# Patient Record
Sex: FEMALE | Race: WHITE
[De-identification: names, ages, dates, MRNs, and addresses within clinical notes are randomized per-mention and may not be internally consistent; named-entity substitution may affect disease eponyms.]

---

## 2017-05-17 ENCOUNTER — HOSPITAL ENCOUNTER (OUTPATIENT)
Dept: HOSPITAL 76 - LAB.F | Age: 37
Discharge: HOME | End: 2017-05-17
Attending: NURSE PRACTITIONER
Payer: MEDICAID

## 2017-05-17 DIAGNOSIS — R60.1: Primary | ICD-10-CM

## 2017-05-17 LAB
ALBUMIN/GLOB SERPL: 1.3 {RATIO} (ref 1–2.2)
ANION GAP SERPL CALCULATED.4IONS-SCNC: 7 MMOL/L (ref 6–13)
BASOPHILS NFR BLD AUTO: 0 10^3/UL (ref 0–0.1)
BASOPHILS NFR BLD AUTO: 0.3 %
BILIRUB BLD-MCNC: 0.6 MG/DL (ref 0.2–1)
BUN SERPL-MCNC: 8 MG/DL (ref 6–20)
CALCIUM UR-MCNC: 9.2 MG/DL (ref 8.5–10.3)
CHLORIDE SERPL-SCNC: 106 MMOL/L (ref 101–111)
CO2 SERPL-SCNC: 26 MMOL/L (ref 21–32)
CREAT SERPLBLD-SCNC: 0.7 MG/DL (ref 0.4–1)
EOSINOPHIL # BLD AUTO: 0.1 10^3/UL (ref 0–0.7)
EOSINOPHIL NFR BLD AUTO: 1.2 %
ERYTHROCYTE [DISTWIDTH] IN BLOOD BY AUTOMATED COUNT: 13.3 % (ref 12–15)
GFRSERPLBLD MDRD-ARVRAT: 94 ML/MIN/{1.73_M2} (ref 89–?)
GLOBULIN SER-MCNC: 3.2 G/DL (ref 2.1–4.2)
GLUCOSE SERPL-MCNC: 90 MG/DL (ref 70–100)
HCT VFR BLD AUTO: 44.3 % (ref 37–47)
HGB UR QL STRIP: 14.5 G/DL (ref 12–16)
LYMPHOCYTES # SPEC AUTO: 2.7 10^3/UL (ref 1.5–3.5)
LYMPHOCYTES NFR BLD AUTO: 29.3 %
MCH RBC QN AUTO: 29.6 PG (ref 27–31)
MCHC RBC AUTO-ENTMCNC: 32.7 G/DL (ref 32–36)
MCV RBC AUTO: 90.4 FL (ref 81–99)
MONOCYTES # BLD AUTO: 0.5 10^3/UL (ref 0–1)
MONOCYTES NFR BLD AUTO: 5.3 %
NEUTROPHILS # BLD AUTO: 5.8 10^3/UL (ref 1.5–6.6)
NEUTROPHILS # SNV AUTO: 9.1 X10^3/UL (ref 4.8–10.8)
NEUTROPHILS NFR BLD AUTO: 63.9 %
NRBC # BLD AUTO: 0.1 /100WBC
PDW BLD AUTO: 12.2 FL (ref 7.9–10.8)
POTASSIUM SERPL-SCNC: 4.1 MMOL/L (ref 3.5–5)
PROT SPEC-MCNC: 7.4 G/DL (ref 6.7–8.2)
RBC MAR: 4.9 10^6/UL (ref 4.2–5.4)
SODIUM SERPLBLD-SCNC: 139 MMOL/L (ref 135–145)
TSH SERPL-ACNC: 2.19 UIU/ML (ref 0.34–5.6)
WBC # BLD: 9.1 X10^3/UL

## 2017-05-17 PROCEDURE — 80053 COMPREHEN METABOLIC PANEL: CPT

## 2017-05-17 PROCEDURE — 84481 FREE ASSAY (FT-3): CPT

## 2017-05-17 PROCEDURE — 85025 COMPLETE CBC W/AUTO DIFF WBC: CPT

## 2017-05-17 PROCEDURE — 83880 ASSAY OF NATRIURETIC PEPTIDE: CPT

## 2017-05-17 PROCEDURE — 84439 ASSAY OF FREE THYROXINE: CPT

## 2017-05-17 PROCEDURE — 84443 ASSAY THYROID STIM HORMONE: CPT

## 2017-05-17 PROCEDURE — 36415 COLL VENOUS BLD VENIPUNCTURE: CPT

## 2018-05-17 ENCOUNTER — HOSPITAL ENCOUNTER (OUTPATIENT)
Dept: HOSPITAL 76 - LAB.F | Age: 38
Discharge: HOME | End: 2018-05-17
Attending: NURSE PRACTITIONER
Payer: MEDICAID

## 2018-05-17 DIAGNOSIS — I10: Primary | ICD-10-CM

## 2018-05-17 LAB
ALBUMIN DIAFP-MCNC: 4.2 G/DL (ref 3.2–5.5)
ALBUMIN/GLOB SERPL: 1.3 {RATIO} (ref 1–2.2)
ALP SERPL-CCNC: 75 IU/L (ref 42–121)
ALT SERPL W P-5'-P-CCNC: 31 IU/L (ref 10–60)
ANION GAP SERPL CALCULATED.4IONS-SCNC: 9 MMOL/L (ref 6–13)
AST SERPL W P-5'-P-CCNC: 26 IU/L (ref 10–42)
BASOPHILS NFR BLD AUTO: 0 10^3/UL (ref 0–0.1)
BASOPHILS NFR BLD AUTO: 0.3 %
BILIRUB BLD-MCNC: 0.9 MG/DL (ref 0.2–1)
BUN SERPL-MCNC: 10 MG/DL (ref 6–20)
CALCIUM UR-MCNC: 9.3 MG/DL (ref 8.5–10.3)
CHLORIDE SERPL-SCNC: 104 MMOL/L (ref 101–111)
CO2 SERPL-SCNC: 26 MMOL/L (ref 21–32)
CREAT SERPLBLD-SCNC: 0.6 MG/DL (ref 0.4–1)
EOSINOPHIL # BLD AUTO: 0.1 10^3/UL (ref 0–0.7)
EOSINOPHIL NFR BLD AUTO: 1.2 %
ERYTHROCYTE [DISTWIDTH] IN BLOOD BY AUTOMATED COUNT: 13.2 % (ref 12–15)
GFRSERPLBLD MDRD-ARVRAT: 112 ML/MIN/{1.73_M2} (ref 89–?)
GLOBULIN SER-MCNC: 3.2 G/DL (ref 2.1–4.2)
GLUCOSE SERPL-MCNC: 96 MG/DL (ref 70–100)
HGB UR QL STRIP: 13.9 G/DL (ref 12–16)
LYMPHOCYTES # SPEC AUTO: 2.2 10^3/UL (ref 1.5–3.5)
LYMPHOCYTES NFR BLD AUTO: 23.1 %
MCH RBC QN AUTO: 29.6 PG (ref 27–31)
MCHC RBC AUTO-ENTMCNC: 33.3 G/DL (ref 32–36)
MCV RBC AUTO: 89 FL (ref 81–99)
MONOCYTES # BLD AUTO: 0.4 10^3/UL (ref 0–1)
MONOCYTES NFR BLD AUTO: 4.6 %
NEUTROPHILS # BLD AUTO: 6.6 10^3/UL (ref 1.5–6.6)
NEUTROPHILS # SNV AUTO: 9.4 X10^3/UL (ref 4.8–10.8)
NEUTROPHILS NFR BLD AUTO: 70.8 %
PDW BLD AUTO: 11.6 FL (ref 7.9–10.8)
PLATELET # BLD: 210 10^3/UL (ref 130–450)
PROT SPEC-MCNC: 7.4 G/DL (ref 6.7–8.2)
RBC MAR: 4.69 10^6/UL (ref 4.2–5.4)
SODIUM SERPLBLD-SCNC: 139 MMOL/L (ref 135–145)
T4 FREE SERPL-MCNC: 0.87 NG/DL (ref 0.58–1.64)
TSH SERPL-ACNC: 2.17 UIU/ML (ref 0.34–5.6)

## 2018-05-17 PROCEDURE — 84443 ASSAY THYROID STIM HORMONE: CPT

## 2018-05-17 PROCEDURE — 84439 ASSAY OF FREE THYROXINE: CPT

## 2018-05-17 PROCEDURE — 80053 COMPREHEN METABOLIC PANEL: CPT

## 2018-05-17 PROCEDURE — 85025 COMPLETE CBC W/AUTO DIFF WBC: CPT

## 2018-05-17 PROCEDURE — 84481 FREE ASSAY (FT-3): CPT

## 2018-05-17 PROCEDURE — 36415 COLL VENOUS BLD VENIPUNCTURE: CPT

## 2018-07-26 ENCOUNTER — HOSPITAL ENCOUNTER (OUTPATIENT)
Dept: HOSPITAL 76 - RT.S | Age: 38
Discharge: HOME | End: 2018-07-26
Attending: NURSE PRACTITIONER
Payer: MEDICAID

## 2018-07-26 DIAGNOSIS — R00.2: Primary | ICD-10-CM

## 2018-07-26 PROCEDURE — 93005 ELECTROCARDIOGRAM TRACING: CPT

## 2018-09-13 ENCOUNTER — HOSPITAL ENCOUNTER (OUTPATIENT)
Dept: HOSPITAL 76 - DI | Age: 38
Discharge: HOME | End: 2018-09-13
Attending: NURSE PRACTITIONER
Payer: MEDICAID

## 2018-09-13 DIAGNOSIS — R00.2: Primary | ICD-10-CM

## 2018-09-13 DIAGNOSIS — R06.00: ICD-10-CM

## 2018-09-13 DIAGNOSIS — I10: ICD-10-CM

## 2018-09-13 PROCEDURE — 93017 CV STRESS TEST TRACING ONLY: CPT

## 2018-09-15 NOTE — CARDIAC PROCEDURE NOTE
DATE OF SERVICE: 09/13/2018

Physician: TRENT Queen

 

ORDERING PROVIDER:  TRENT Arias

 

PROCEDURE:  Stress echocardiogram.

 

PROCEDURE SYMPTOMS:  Daily palpitations and dyspnea on exertion.

 

CARDIAC RISK FACTORS:  Severe hypertension.

 

PREVIOUS CARDIAC PROCEDURES:  None.

 

CLINICAL HISTORY:  A 38-year-old female without known coronary artery disease.  
She has no current symptoms.

 

INITIAL RESTING VITAL SIGNS:  Blood pressure 124/74, heart rate 76, height 64 
inches, weight 191 pounds, BMI 32.78.  The patient consumed a small cup of 
coffee 5 hours ago.

 

PROCEDURE AND FINDINGS:  The patient's identity and birth date verified.  
Consent signed.

 

After resting echocardiogram images were obtained, the patient performed 
treadmill exercise using a Te protocol, completing 5 minutes, 58 seconds and 
an estimated workload of 7.05 metabolic equivalents.  Maximal blood pressure was
146/70, with a heart rate of 162 beats per minute or 89% of maximum predicted 
heart rate for age.  The blood pressure response to exercise was within normal 
limits.  The patient stopped because she rated exercise as hard and was getting 
short of breath.  She could also feel her heartbeat in her neck.  The resting 
ECG showed normal sinus rhythm with slight slurred upstroke and intraventricular
conduction delay.  There was less than 0.5 mm ST segment depression. The patient
developed a right bundle branch block in the last minute of exercise when her 
heart rate was over 160.  The bundle branch resolved by 3 minutes into recovery.
 Post-exercise images were obtained immediately on cessation of exercise.

 

FINAL IMPRESSION

1.  Good quality test.

2.  No ST segment depression of significance.

3.  Negative stress test clinically for angina.

4.  Developed right bundle branch block in the last minute of the test, 
resolving spontaneously 3 minutes later.

5.  The patient did have palpitations and shortness of breath at peak exercise.

6. 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 Recommend referral to cardiologist for evaluation.

 

 

DD: 09/14/2018 18:05

TD: 09/14/2018 18:13

Job #: [id number]

ANALY

## 2018-10-11 ENCOUNTER — HOSPITAL ENCOUNTER (EMERGENCY)
Dept: HOSPITAL 76 - ED | Age: 38
Discharge: HOME | End: 2018-10-11
Payer: MEDICAID

## 2018-10-11 VITALS — DIASTOLIC BLOOD PRESSURE: 74 MMHG | SYSTOLIC BLOOD PRESSURE: 120 MMHG

## 2018-10-11 DIAGNOSIS — N20.0: Primary | ICD-10-CM

## 2018-10-11 LAB
ALBUMIN DIAFP-MCNC: 4.2 G/DL (ref 3.2–5.5)
ALBUMIN/GLOB SERPL: 1.4 {RATIO} (ref 1–2.2)
ALP SERPL-CCNC: 84 IU/L (ref 42–121)
ALT SERPL W P-5'-P-CCNC: 43 IU/L (ref 10–60)
AMPHET UR QL SCN: NEGATIVE
AST SERPL W P-5'-P-CCNC: 37 IU/L (ref 10–42)
BASOPHILS NFR BLD AUTO: 0 10^3/UL (ref 0–0.1)
BASOPHILS NFR BLD AUTO: 0.4 %
BENZODIAZ UR QL SCN: NEGATIVE
BILIRUB BLD-MCNC: 0.9 MG/DL (ref 0.2–1)
BUN SERPL-MCNC: 10 MG/DL (ref 6–20)
CALCIUM UR-MCNC: 8.1 MG/DL (ref 8.5–10.3)
CHLORIDE SERPL-SCNC: 105 MMOL/L (ref 101–111)
CLARITY UR REFRACT.AUTO: (no result)
CO2 SERPL-SCNC: 19 MMOL/L (ref 21–32)
COCAINE UR-SCNC: NEGATIVE UMOL/L
CREAT SERPLBLD-SCNC: 0.7 MG/DL (ref 0.4–1)
EOSINOPHIL # BLD AUTO: 0 10^3/UL (ref 0–0.7)
EOSINOPHIL NFR BLD AUTO: 0.1 %
ERYTHROCYTE [DISTWIDTH] IN BLOOD BY AUTOMATED COUNT: 12.5 % (ref 12–15)
GFRSERPLBLD MDRD-ARVRAT: 94 ML/MIN/{1.73_M2} (ref 89–?)
GLOBULIN SER-MCNC: 2.9 G/DL (ref 2.1–4.2)
GLUCOSE SERPL-MCNC: 120 MG/DL (ref 70–100)
GLUCOSE UR QL STRIP.AUTO: NEGATIVE MG/DL
HCG UR QL: NEGATIVE
HGB UR QL STRIP: 13.8 G/DL (ref 12–16)
KETONES UR QL STRIP.AUTO: NEGATIVE MG/DL
LIPASE SERPL-CCNC: 30 U/L (ref 22–51)
LYMPHOCYTES # SPEC AUTO: 1 10^3/UL (ref 1.5–3.5)
LYMPHOCYTES NFR BLD AUTO: 8.2 %
MCH RBC QN AUTO: 30.9 PG (ref 27–31)
MCHC RBC AUTO-ENTMCNC: 34.5 G/DL (ref 32–36)
MCV RBC AUTO: 89.8 FL (ref 81–99)
METHADONE UR QL SCN: NEGATIVE
METHAMPHET UR QL SCN: NEGATIVE
MONOCYTES # BLD AUTO: 0.3 10^3/UL (ref 0–1)
MONOCYTES NFR BLD AUTO: 2.2 %
NEUTROPHILS # BLD AUTO: 10.9 10^3/UL (ref 1.5–6.6)
NEUTROPHILS # SNV AUTO: 12.2 X10^3/UL (ref 4.8–10.8)
NEUTROPHILS NFR BLD AUTO: 89.1 %
NITRITE UR QL STRIP.AUTO: NEGATIVE
OPIATES UR QL SCN: NEGATIVE
PDW BLD AUTO: 10.5 FL (ref 7.9–10.8)
PH UR STRIP.AUTO: 6 PH (ref 5–7.5)
PLATELET # BLD: 202 10^3/UL (ref 130–450)
PROT SPEC-MCNC: 7.1 G/DL (ref 6.7–8.2)
PROT UR STRIP.AUTO-MCNC: 30 MG/DL
RBC # UR STRIP.AUTO: (no result) /UL
RBC MAR: 4.46 10^6/UL (ref 4.2–5.4)
SP GR UR STRIP.AUTO: >=1.03 (ref 1–1.03)
SP GR UR STRIP.AUTO: >=1.03 (ref 1–1.03)
SQUAMOUS URNS QL MICRO: (no result)
UROBILINOGEN UR QL STRIP.AUTO: (no result) E.U./DL
UROBILINOGEN UR STRIP.AUTO-MCNC: NEGATIVE MG/DL
VOLATILE DRUGS POS SERPL SCN: (no result)

## 2018-10-11 PROCEDURE — 80053 COMPREHEN METABOLIC PANEL: CPT

## 2018-10-11 PROCEDURE — 96375 TX/PRO/DX INJ NEW DRUG ADDON: CPT

## 2018-10-11 PROCEDURE — 96376 TX/PRO/DX INJ SAME DRUG ADON: CPT

## 2018-10-11 PROCEDURE — 85025 COMPLETE CBC W/AUTO DIFF WBC: CPT

## 2018-10-11 PROCEDURE — 87086 URINE CULTURE/COLONY COUNT: CPT

## 2018-10-11 PROCEDURE — 99285 EMERGENCY DEPT VISIT HI MDM: CPT

## 2018-10-11 PROCEDURE — 96367 TX/PROPH/DG ADDL SEQ IV INF: CPT

## 2018-10-11 PROCEDURE — 36415 COLL VENOUS BLD VENIPUNCTURE: CPT

## 2018-10-11 PROCEDURE — 81003 URINALYSIS AUTO W/O SCOPE: CPT

## 2018-10-11 PROCEDURE — 83690 ASSAY OF LIPASE: CPT

## 2018-10-11 PROCEDURE — 86308 HETEROPHILE ANTIBODY SCREEN: CPT

## 2018-10-11 PROCEDURE — 81025 URINE PREGNANCY TEST: CPT

## 2018-10-11 PROCEDURE — 74177 CT ABD & PELVIS W/CONTRAST: CPT

## 2018-10-11 PROCEDURE — 81001 URINALYSIS AUTO W/SCOPE: CPT

## 2018-10-11 PROCEDURE — 96365 THER/PROPH/DIAG IV INF INIT: CPT

## 2018-10-11 PROCEDURE — 99284 EMERGENCY DEPT VISIT MOD MDM: CPT

## 2018-10-11 PROCEDURE — 80306 DRUG TEST PRSMV INSTRMNT: CPT

## 2018-10-11 NOTE — CT REPORT
Reason:  ABDOMINAL PAIN

Procedure Date:  10/11/2018   

Accession Number:  [id number] / [id number]                    

Procedure:  CT  - Abdomen/Pelvis W/ CPT Code:  

 

FULL RESULT:

 

 

EXAM:

CT ABDOMEN AND PELVIS

 

EXAM DATE: 10/11/2018 01:20 PM.

 

CLINICAL HISTORY: Abdominal pain.

 

COMPARISONS: None.

 

TECHNIQUE: Routine helical CT imaging was performed through the abdomen 

and pelvis. IV contrast: ISOVUE 300  100mL. Enteric contrast: No. 

Reconstructions: Coronal and sagittal.

 

In accordance with CT protocol optimization, one or more of the following 

dose reduction techniques were utilized for this exam: automated exposure 

control, adjustment of mA and/or KV based on patient size, or use of 

iterative reconstructive technique.

 

FINDINGS:

Lung Bases: Unremarkable.

 

Liver: Normal. No masses.

 

Gallbladder/Bile Ducts: Status post cholecystectomy.

 

Spleen: Normal.

 

Pancreas: Normal.

 

Adrenal Glands: Normal.

 

Kidneys: There is left perinephric fat stranding and mild hydronephrosis 

due to a obstructing 6 mm ureteral calculus on the left at the level of 

L3.

 

Peritoneal Cavity/Bowel: Normal. No free fluid, free air or adenopathy. 

No masses or acute inflammatory process. The appendix is well visualized 

and normal.

 

Pelvic Organs: Normal. The bladder and visualized pelvic organs are 

within normal limits.

 

Vasculature: No aneurysms or other significant abnormality.

 

Bones: No aggressive osseous lesion.

 

Other: None.

IMPRESSION: Obstructing left 6 mm calculus with mild upstream 

hydronephrosis.

 

[facility] cRITICAL RESULT: The findings were discussed with Dr. Hanson on 

10/11/2018 at 1:25 PM.

## 2018-10-11 NOTE — ED PHYSICIAN DOCUMENTATION
PD HPI ABD PAIN





- Stated complaint


Stated Complaint: UPPER LF ABD PX/VOMITING





- Chief complaint


Chief Complaint: Abd Pain





- History obtained from


History obtained from: Patient





- History of Present Illness


Timing - duration: Hours (3)


Timing - details: Abrupt onset, Waxing and waning, Still present in ED


Pain level max: 10


Pain level now: 10


Quality: Sharp


Location: [facility]


Improved by: Other (NOTHING)


Worsened by: Other (NOTHING)


Associated symptoms: Nausea, Vomiting.  No: Fever, Hematemesis, Diarrhea, 

Constipation, Dysuria, Hematuria, Chest pain, Dizzy, Near syncope / syncope, 

Loss of appetite, Vaginal dc


Similar symptoms before: Has not had sx before


Recently seen: Not recently seen





Review of Systems


Ten Systems: 10 systems reviewed and negative


Constitutional: denies: Fever, Chills


Cardiac: denies: Chest pain / pressure


Respiratory: denies: Dyspnea


GI: reports: Abdominal Pain, Nausea, Vomiting.  denies: Constipation, Diarrhea


: denies: Dysuria


Musculoskeletal: denies: Back pain





PD PAST MEDICAL HISTORY





- Past Medical History


Cardiovascular: None


Respiratory: None


Endocrine/Autoimmune: None


GI: None


GYN: Miscarriage(s)


HEENT: None


Derm: None





- Past Surgical History


Past Surgical History: Yes


General: Cholecystectomy





- Present Medications


Home Medications: 


                                Ambulatory Orders











 Medication  Instructions  Recorded  Confirmed


 


RX: Atenolol 50 mg PO DAILY 07/13/15 07/13/15


 


RX: Naproxen 500 mg PO BID #20 tablet. 07/13/15 


 


Ondansetron HCl [Zofran] 4 mg PO Q6H PRN #20 tablet 03/30/16 


 


Oxycodone HCl/Acetaminophen 1 each PO Q6H PRN #20 tablet 03/30/16 





[Percocet 5-325 mg Tablet]   


 


RX: raNITIdine [Zantac] 150 mg PO BID #20 tablet 03/30/16 


 


Diphenoxylate HCl/Atropine 1 each PO Q6H PRN #20 tablet 06/16/16 





[Lomotil 2.5-0.025 mg Tablet]   


 


Ondansetron HCl [Zofran] 4 mg PO Q6H PRN #20 tablet 06/16/16 


 


Ibuprofen [Motrin] 800 mg PO Q8H PRN #30 tablet 10/11/18 


 


Ondansetron Odt [Zofran] 4 mg TL Q6H PRN #10 tablet 10/11/18 


 


Oxycodone HCl/Acetaminophen 1 each PO Q6H PRN #14 tablet 10/11/18 





[Percocet 5-325 mg Tablet]   


 


Tamsulosin HCl [Flomax] 0.4 mg PO DAILY #7 cap.er.24h 10/11/18 














- Allergies


Allergies/Adverse Reactions: 


                                    Allergies











Allergy/AdvReac Type Severity Reaction Status Date / Time


 


hydrocodone bitartrate * AdvReac Mild Itching Verified 10/11/18 09:50





[From Vicodin]     


 


lidocaine AdvReac Mild Itching Verified 10/11/18 09:50














- Social History


Does the pt smoke?: No


Smoking Status: Never smoker


Does the pt drink ETOH?: No


Does the pt have substance abuse?: No





- Immunizations


Immunizations are current?: Yes





PD ED PE NORMAL





- Vitals


Vital signs reviewed: Yes





- General


General: Alert and oriented X 3, Well developed/nourished, Other (Appears 

uncomfortable due to abdominal pain while laying on her right side.)





- HEENT


HEENT: Moist mucous membranes





- Neck


Neck: Supple, no meningeal sign





- Cardiac


Cardiac: RRR, No murmur





- Respiratory


Respiratory: No respiratory distress, Clear bilaterally





- Abdomen


Abdomen: Normal bowel sounds, Soft, Non distended, Other (tender to palpation of

LUQ and LLQ of the abdomen. No rebound. No guarding.)





- Back


Back: No CVA TTP, No spinal TTP





- Derm


Derm: Normal color, Warm and dry





- Extremities


Extremities: No deformity





- Neuro


Neuro: Alert and oriented X 3, Normal speech





- Psych


Psych: Normal mood, Normal affect





Results





- Vitals


Vitals: 


                               Vital Signs - 24 hr











  10/11/18 10/11/18 10/11/18





  09:47 10:46 11:00


 


Temperature 36.7 C  


 


Heart Rate 83 87 89


 


Respiratory 22 16 16





Rate   


 


Blood Pressure 144/92 H 131/83 H 131/83 H


 


O2 Saturation 100 100 100














  10/11/18 10/11/18 10/11/18





  11:41 11:50 14:11


 


Temperature   


 


Heart Rate 95 102 H 85


 


Respiratory 15 21 15





Rate   


 


Blood Pressure 151/85 H 151/85 H 128/77


 


O2 Saturation 100 100 96














  10/11/18





  14:30


 


Temperature 


 


Heart Rate 86


 


Respiratory 16





Rate 


 


Blood Pressure 128/77


 


O2 Saturation 97








                                     Oxygen











O2 Source                      Room air

















- Labs


Labs: 


                                Laboratory Tests











  10/11/18 10/11/18 10/11/18





  09:55 09:55 09:55


 


WBC   


 


RBC   


 


Hgb   


 


Hct   


 


MCV   


 


MCH   


 


MCHC   


 


RDW   


 


Plt Count   


 


MPV   


 


Neut # (Auto)   


 


Lymph # (Auto)   


 


Mono # (Auto)   


 


Eos # (Auto)   


 


Baso # (Auto)   


 


Absolute Nucleated RBC   


 


Nucleated RBC %   


 


Sodium   


 


Potassium   


 


Chloride   


 


Carbon Dioxide   


 


Anion Gap   


 


BUN   


 


Creatinine   


 


Estimated GFR (MDRD)   


 


Glucose   


 


Calcium   


 


Total Bilirubin   


 


AST   


 


ALT   


 


Alkaline Phosphatase   


 


Total Protein   


 


Albumin   


 


Globulin   


 


Albumin/Globulin Ratio   


 


Lipase   


 


Urine Color  YELLOW  


 


Urine Clarity  SL. CLOUDY  


 


Urine pH  6.0  


 


Ur Specific Gravity  >=1.030 H  >=1.030 H 


 


Urine Protein  30 H  


 


Urine Glucose (UA)  NEGATIVE  


 


Urine Ketones  NEGATIVE  


 


Urine Occult Blood  LARGE H  


 


Urine Nitrite  NEGATIVE  


 


Urine Bilirubin  NEGATIVE  


 


Urine Urobilinogen  0.2 (NORMAL)  


 


Ur Leukocyte Esterase  NEGATIVE  


 


Urine RBC  11-25 H  


 


Urine WBC  0-3  


 


Ur Squamous Epith Cells  MANY Squamous H  


 


Urine Bacteria  Few  


 


Ur Microscopic Review  INDICATED  


 


Urine Culture Comments  NOT INDICATED  


 


Urine HCG, Qual   NEGATIVE 


 


Urine Opiates Screen    NEGATIVE


 


Ur Oxycodone Screen    NEGATIVE


 


Urine Methadone Screen    NEGATIVE


 


Ur Propoxyphene Screen    NEGATIVE


 


Ur Barbiturates Screen    NEGATIVE


 


Ur Tricyclics Screen    NEGATIVE


 


Ur Phencyclidine Scrn    NEGATIVE


 


Ur Amphetamine Screen    NEGATIVE


 


U Methamphetamines Scrn    NEGATIVE


 


U Benzodiazepines Scrn    NEGATIVE


 


Urine Cocaine Screen    NEGATIVE


 


U Cannabinoids Screen    NEGATIVE


 


Infectious Mono Assay   














  10/11/18 10/11/18 10/11/18





  12:16 12:16 12:16


 


WBC  12.2 H  


 


RBC  4.46  


 


Hgb  13.8  


 


Hct  40.0  


 


MCV  89.8  


 


MCH  30.9  


 


MCHC  34.5  


 


RDW  12.5  


 


Plt Count  202  


 


MPV  10.5  


 


Neut # (Auto)  10.9 H  


 


Lymph # (Auto)  1.0 L  


 


Mono # (Auto)  0.3  


 


Eos # (Auto)  0.0  


 


Baso # (Auto)  0.0  


 


Absolute Nucleated RBC  0.00  


 


Nucleated RBC %  0.0  


 


Sodium   132 L 


 


Potassium   3.3 L 


 


Chloride   105 


 


Carbon Dioxide   19 L 


 


Anion Gap   8.0 


 


BUN   10 


 


Creatinine   0.7 


 


Estimated GFR (MDRD)   94 


 


Glucose   120 H 


 


Calcium   8.1 L 


 


Total Bilirubin   0.9 


 


AST   37 


 


ALT   43 


 


Alkaline Phosphatase   84 


 


Total Protein   7.1 


 


Albumin   4.2 


 


Globulin   2.9 


 


Albumin/Globulin Ratio   1.4 


 


Lipase   30 


 


Urine Color   


 


Urine Clarity   


 


Urine pH   


 


Ur Specific Gravity   


 


Urine Protein   


 


Urine Glucose (UA)   


 


Urine Ketones   


 


Urine Occult Blood   


 


Urine Nitrite   


 


Urine Bilirubin   


 


Urine Urobilinogen   


 


Ur Leukocyte Esterase   


 


Urine RBC   


 


Urine WBC   


 


Ur Squamous Epith Cells   


 


Urine Bacteria   


 


Ur Microscopic Review   


 


Urine Culture Comments   


 


Urine HCG, Qual   


 


Urine Opiates Screen   


 


Ur Oxycodone Screen   


 


Urine Methadone Screen   


 


Ur Propoxyphene Screen   


 


Ur Barbiturates Screen   


 


Ur Tricyclics Screen   


 


Ur Phencyclidine Scrn   


 


Ur Amphetamine Screen   


 


U Methamphetamines Scrn   


 


U Benzodiazepines Scrn   


 


Urine Cocaine Screen   


 


U Cannabinoids Screen   


 


Infectious Mono Assay    NEGATIVE














PD MEDICAL DECISION MAKING





- ED course


Complexity details: reviewed results, re-evaluated patient (1013 pt requesting 

for pain med. She stated she had morphine before without reactions. Pt informed 

as soon as her UCG is negative she will be given pain meds. 1126 Per nurse pt 

just received pain med 10 minutes ago and requesting for another dose. No MS 

given right now. 1155 Pt continues to have pain, will give another MS dose. 1336

Pt informed of test results. Explained to pt and spouse the CT scan results. Pt 

informed a urology consult is pending. 1445 Pt informed of urologist 

appointment. Pt states feels better after given toradol and lidocaine IV. Agreed

to outpatient follow up w/ the urologist. Stated wants pain meds prescription. 

She stated had percocet before without problems. Pt will discharged on percocet,

motrin, zofran and flomax. ), considered differential (pancreatitis, 

obstruction, volvolus, uti, kidney stone, diverticulitis), d/w patient, d/w 

family, d/w consultant





- Consults


Consults: Consulted (name) ([facility] urologist seeing a pt right now. Info 

given to urologist nurse. They want the CT scan be sent to them. [facility] informed. 

1416 Urologist Dr Worley called me back and she stated pt's CT scan is 

unimpressive so pt can be discharged and they will call us back for pt's follow 

up appointment.)





Departure





- Departure


Disposition: 01 Home, Self Care


Clinical Impression: 


 Renal colic, Kidney stone on left side





Abdominal pain


Qualifiers:


 Abdominal location: left upper quadrant Qualified Code(s): R10.12 - Left upper 

quadrant pain





Condition: Good


Instructions:  Abdominal Pain


Follow-Up: 


Leah Savage, ARNP [Primary Care Provider] - 


Prescriptions: 


Ibuprofen [Motrin] 800 mg PO Q8H PRN #30 tablet


 PRN Reason: PAIN &/OR FEVER


Ondansetron Odt [Zofran] 4 mg TL Q6H PRN #10 tablet


 PRN Reason: Nausea / Vomiting


Oxycodone HCl/Acetaminophen [Percocet 5-325 mg Tablet] 1 each PO Q6H PRN #14 

tablet


 PRN Reason: pain


Tamsulosin HCl [Flomax] 0.4 mg PO DAILY #7 cap.er.24h


Comments: 


DRINK 8 GLASSES OF WATER PER DAY. MAINTAIN SAFETY WHILE TAKING PERCOCET. NO 

ALCOHOL. NO DRIVING NOR WORKING WITH MACHINERY. TO PREVENT CONSTIPATION FROM 

NARCOTIC PAIN MED: OTC STOOL SOFTENER, HIGH FIBER FOODS, EXERCISE, WATER. TAKE 

THE MOTRIN WITH FOOD. FOR UROLOGY FOLLOW UP: [facility] UROLOGY SHERRY GARCIA, DR NAVARRO PRIEST ON OCTOBER 25 AT 1200. THEIR TELEPHONE NUMBER [phone]. IF 

WORSE RETURN TO THE ER.


Discharge Date/Time: 10/11/18 15:30

## 2018-10-12 LAB
ANION GAP SERPL CALCULATED.4IONS-SCNC: 14 MMOL/L (ref 6–13)
SODIUM SERPLBLD-SCNC: 138 MMOL/L (ref 135–145)

## 2018-10-15 ENCOUNTER — HOSPITAL ENCOUNTER (EMERGENCY)
Dept: HOSPITAL 76 - ED | Age: 38
Discharge: HOME | End: 2018-10-15
Payer: MEDICAID

## 2018-10-15 VITALS — SYSTOLIC BLOOD PRESSURE: 148 MMHG | DIASTOLIC BLOOD PRESSURE: 73 MMHG

## 2018-10-15 DIAGNOSIS — N20.0: Primary | ICD-10-CM

## 2018-10-15 DIAGNOSIS — R31.9: ICD-10-CM

## 2018-10-15 LAB
CLARITY UR REFRACT.AUTO: CLEAR
GLUCOSE UR QL STRIP.AUTO: NEGATIVE MG/DL
KETONES UR QL STRIP.AUTO: NEGATIVE MG/DL
NITRITE UR QL STRIP.AUTO: NEGATIVE
PH UR STRIP.AUTO: 6 PH (ref 5–7.5)
PROT UR STRIP.AUTO-MCNC: NEGATIVE MG/DL
RBC # UR STRIP.AUTO: (no result) /UL
RBC # URNS HPF: (no result) /HPF (ref 0–5)
SP GR UR STRIP.AUTO: 1.02 (ref 1–1.03)
SQUAMOUS URNS QL MICRO: (no result)
UROBILINOGEN UR QL STRIP.AUTO: (no result) E.U./DL
UROBILINOGEN UR STRIP.AUTO-MCNC: NEGATIVE MG/DL

## 2018-10-15 PROCEDURE — 96372 THER/PROPH/DIAG INJ SC/IM: CPT

## 2018-10-15 PROCEDURE — 81001 URINALYSIS AUTO W/SCOPE: CPT

## 2018-10-15 PROCEDURE — 99283 EMERGENCY DEPT VISIT LOW MDM: CPT

## 2018-10-15 PROCEDURE — 87086 URINE CULTURE/COLONY COUNT: CPT

## 2018-10-15 PROCEDURE — 81003 URINALYSIS AUTO W/O SCOPE: CPT

## 2018-10-15 PROCEDURE — 99284 EMERGENCY DEPT VISIT MOD MDM: CPT

## 2018-10-15 NOTE — ED PHYSICIAN DOCUMENTATION
History of Present Illness





- Stated complaint


Stated Complaint: L ABDOMINAL PX





- Chief complaint


Chief Complaint: Abd Pain





- Additonal information


Additional information: 


38-year-old female presents the emergency department for evaluation of a urine 

infection.  The patient has a recent diagnosis of a obstructing kidney stone.  

The patient is scheduled to see the urologist in 2 days.  The patient was 

concerned about a coinciding urine infection.  The patient denies fevers, 

chills.  The patient has had ongoing pain from the kidney stone. The patient has

no acute changes in her symptoms.  The patient has had no significant 

improvement of her symptoms.  The patient has had some nausea, vomiting and 

diarrhea.  Symptoms are described as moderate.





Review of Systems


Constitutional: denies: Fever, Chills


Cardiac: denies: Chest pain / pressure


Respiratory: denies: Cough


GI: reports: Nausea, Vomiting, Diarrhea


: denies: Dysuria


Musculoskeletal: denies: Neck pain


Neurologic: denies: Generalized weakness





PD PAST MEDICAL HISTORY





- Past Medical History


Past Medical History: Yes


Cardiovascular: None


Respiratory: None


Neuro: None


Endocrine/Autoimmune: None


GI: None


GYN: Miscarriage(s)


: None


HEENT: None


Psych: None


Musculoskeletal: None


Derm: None





- Past Surgical History


Past Surgical History: Yes


General: Cholecystectomy





- Present Medications


Home Medications: 


                                Ambulatory Orders











 Medication  Instructions  Recorded  Confirmed


 


Atenolol 50 mg PO DAILY 07/13/15 07/13/15


 


Naproxen 500 mg PO BID #20 tablet. 07/13/15 


 


Ondansetron HCl [Zofran] 4 mg PO Q6H PRN #20 tablet 03/30/16 


 


Oxycodone HCl/Acetaminophen 1 each PO Q6H PRN #20 tablet 03/30/16 





[Percocet 5-325 mg Tablet]   


 


raNITIdine [Zantac] 150 mg PO BID #20 tablet 03/30/16 


 


Diphenoxylate HCl/Atropine 1 each PO Q6H PRN #20 tablet 06/16/16 





[Lomotil 2.5-0.025 mg Tablet]   


 


Ondansetron HCl [Zofran] 4 mg PO Q6H PRN #20 tablet 06/16/16 


 


Ibuprofen [Motrin] 800 mg PO Q8H PRN #30 tablet 10/11/18 


 


Ondansetron Odt [Zofran] 4 mg TL Q6H PRN #10 tablet 10/11/18 


 


Oxycodone HCl/Acetaminophen 1 each PO Q6H PRN #14 tablet 10/11/18 





[Percocet 5-325 mg Tablet]   


 


Tamsulosin HCl [Flomax] 0.4 mg PO DAILY #7 cap.er.24h 10/11/18 














- Allergies


Allergies/Adverse Reactions: 


                                    Allergies











Allergy/AdvReac Type Severity Reaction Status Date / Time


 


hydrocodone bitartrate * AdvReac Mild Itching Verified 10/15/18 20:29





[From Vicodin]     


 


lidocaine AdvReac Mild Itching Verified 10/15/18 20:29














- Social History


Does the pt smoke?: No


Smoking Status: Never smoker


Does the pt drink ETOH?: No


Does the pt have substance abuse?: No





- Immunizations


Immunizations are current?: Yes





- POLST


Patient has POLST: No





PD ED PE NORMAL





- General


General: Alert and oriented X 3, No acute distress





- HEENT


HEENT: Atraumatic, PERRL, EOMI, Ears normal





- Neck


Neck: Supple, no meningeal sign





- Cardiac


Cardiac: RRR, Strong equal pulses





- Respiratory


Respiratory: No respiratory distress





- Abdomen


Abdomen: Soft, Non tender (The patient has generalized tenderness, no rebound or

peritoneal signs), Non distended





- Derm


Derm: Normal color





- Extremities


Extremities: No deformity





- Neuro


Neuro: Alert and oriented X 3, Normal speech





Results





- Vitals


Vitals: 


                               Vital Signs - 24 hr











  10/15/18





  20:27


 


Temperature 36.8 C


 


Heart Rate 77


 


Respiratory 16





Rate 


 


Blood Pressure 149/86 H


 


O2 Saturation 99








                                     Oxygen











O2 Source                      Room air

















- Labs


Labs: 


                                Laboratory Tests











  10/15/18





  20:41


 


Urine Color  YELLOW


 


Urine Clarity  CLEAR


 


Urine pH  6.0


 


Ur Specific Gravity  1.020


 


Urine Protein  NEGATIVE


 


Urine Glucose (UA)  NEGATIVE


 


Urine Ketones  NEGATIVE


 


Urine Occult Blood  SMALL H


 


Urine Nitrite  NEGATIVE


 


Urine Bilirubin  NEGATIVE


 


Urine Urobilinogen  0.2 (NORMAL)


 


Ur Leukocyte Esterase  NEGATIVE


 


Urine RBC  0-5


 


Urine WBC  0-3


 


Ur Squamous Epith Cells  MOD Squamous H


 


Urine Bacteria  None Seen


 


Ur Microscopic Review  INDICATED


 


Urine Culture Comments  NOT INDICATED














PD MEDICAL DECISION MAKING





- ED course


ED course: 


The patient's urinalysis does not show evidence of an acute urine infection.  

There is hematuria which most likely is from the kidney stone.  The patient's 

vomiting and diarrhea may be secondary to the usage of Motrin recently.  

Presently, the patient's pain appears to be managed, the patient does not want 

any further workup at this time.  The patient's main concern was having an urine

infection.  Currently, the patient is requesting discharge and has appropriate 

follow-up care in 2 days with urology.  I discussed warning signs and 

recommended returning to the emergency department for any worsening or any 

concerns.








Departure





- Departure


Disposition: 01 Home, Self Care


Clinical Impression: 


 Kidney stone





Condition: Good


Instructions:  ED Stone Renal W Colic


Comments: 


Please follow-up with the urologist as scheduled.  Please return to the 

emergency department immediately for any worsening or any concerns

## 2019-02-05 ENCOUNTER — HOSPITAL ENCOUNTER (EMERGENCY)
Dept: HOSPITAL 76 - ED | Age: 39
Discharge: HOME | End: 2019-02-05
Payer: MEDICAID

## 2019-02-05 VITALS — SYSTOLIC BLOOD PRESSURE: 159 MMHG | DIASTOLIC BLOOD PRESSURE: 91 MMHG
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
## 2019-02-05 DIAGNOSIS — S16.1XXA: ICD-10-CM
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
## 2019-02-05 DIAGNOSIS — S43.92XA: Primary | ICD-10-CM

## 2019-02-05 PROCEDURE — 73030 X-RAY EXAM OF SHOULDER: CPT

## 2019-02-05 PROCEDURE — 72070 X-RAY EXAM THORAC SPINE 2VWS: CPT

## 2019-02-05 PROCEDURE — 99283 EMERGENCY DEPT VISIT LOW MDM: CPT

## 2019-02-05 NOTE — XRAY REPORT
Reason:  fall and twisted left shoulder

Procedure Date:  02/05/2019   

Accession Number:  [id number] / [id number]                    

Procedure:  XR  - Shoulder 3 View LT CPT Code:  

 

FULL RESULT:

 

 

EXAM:

LEFT SHOULDER RADIOGRAPHY

 

EXAM DATE: 2/5/2019 06:23 PM.

 

CLINICAL HISTORY: Fall, pain.

 

COMPARISON: None.

 

TECHNIQUE: 3 views.

 

FINDINGS:

Bones: Normal. No fracture or bone lesion.

 

Joints: The glenohumeral and acromioclavicular joints are normal.

 

Soft tissues: The visualized hemithorax is unremarkable. No soft tissue 

swelling.

IMPRESSION: Normal shoulder radiography.

 

[facility]
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
## 2019-02-05 NOTE — XRAY REPORT
Reason:  fall, twisted upper back; struck upper back

Procedure Date:  02/05/2019   

Accession Number:  [id number] / [id number]                    

Procedure:  XR  - Thoracic Spine 2 View CPT Code:  

 

FULL RESULT:

 

 

EXAM:

THORACIC SPINE RADIOGRAPHY

 

EXAM DATE: 2/5/2019 06:23 PM.

 

CLINICAL HISTORY: Fall, twisted upper back;  struck upper back.

 

COMPARISON: None.

 

TECHNIQUE: 2 views.

 

FINDINGS:

Alignment: Minimal scoliosis. No listhesis.

 

Bones: No fractures or bone lesions.

 

Disks: Normal. Disk heights are maintained.

 

Soft Tissues: Normal. The visualized lungs and cardiomediastinal 

silhouette are normal.

IMPRESSION: No acute disease.

 

RADIA

## 2019-02-15 ENCOUNTER — HOSPITAL ENCOUNTER (OUTPATIENT)
Dept: HOSPITAL 76 - LAB.F | Age: 39
Discharge: HOME | End: 2019-02-15
Attending: NURSE PRACTITIONER
Payer: MEDICAID

## 2019-02-15 DIAGNOSIS — Z80.3: Primary | ICD-10-CM

## 2019-02-15 PROCEDURE — 36415 COLL VENOUS BLD VENIPUNCTURE: CPT

## 2019-02-15 PROCEDURE — 81599 UNLISTED MAAA: CPT

## 2019-03-08 ENCOUNTER — HOSPITAL ENCOUNTER (OUTPATIENT)
Dept: HOSPITAL 76 - LAB.F | Age: 39
Discharge: HOME | End: 2019-03-08
Attending: NURSE PRACTITIONER
Payer: MEDICAID

## 2019-03-08 DIAGNOSIS — N63.0: Primary | ICD-10-CM

## 2019-03-08 LAB
CREAT SERPLBLD-SCNC: 0.7 MG/DL (ref 0.4–1)
GFRSERPLBLD MDRD-ARVRAT: 94 ML/MIN/{1.73_M2} (ref 89–?)

## 2019-03-08 PROCEDURE — 82565 ASSAY OF CREATININE: CPT

## 2019-03-08 PROCEDURE — 36415 COLL VENOUS BLD VENIPUNCTURE: CPT

## 2019-06-06 ENCOUNTER — HOSPITAL ENCOUNTER (OUTPATIENT)
Dept: HOSPITAL 76 - LAB | Age: 39
Discharge: HOME | End: 2019-06-06
Attending: NURSE PRACTITIONER
Payer: MEDICAID

## 2019-06-06 DIAGNOSIS — I10: Primary | ICD-10-CM

## 2019-06-06 DIAGNOSIS — Z13.220: ICD-10-CM

## 2019-06-06 DIAGNOSIS — L40.50: ICD-10-CM

## 2019-06-06 LAB
ALBUMIN DIAFP-MCNC: 4.1 G/DL (ref 3.2–5.5)
ALBUMIN/GLOB SERPL: 1.2 {RATIO} (ref 1–2.2)
ALP SERPL-CCNC: 66 IU/L (ref 42–121)
ALT SERPL W P-5'-P-CCNC: 23 IU/L (ref 10–60)
ANION GAP SERPL CALCULATED.4IONS-SCNC: 9 MMOL/L (ref 6–13)
AST SERPL W P-5'-P-CCNC: 20 IU/L (ref 10–42)
BASOPHILS NFR BLD AUTO: 0.1 10^3/UL (ref 0–0.1)
BASOPHILS NFR BLD AUTO: 0.7 %
BILIRUB BLD-MCNC: 0.5 MG/DL (ref 0.2–1)
BUN SERPL-MCNC: 10 MG/DL (ref 6–20)
CALCIUM UR-MCNC: 8.9 MG/DL (ref 8.5–10.3)
CHLORIDE SERPL-SCNC: 106 MMOL/L (ref 101–111)
CHOLEST SERPL-MCNC: 173 MG/DL
CO2 SERPL-SCNC: 24 MMOL/L (ref 21–32)
CREAT SERPLBLD-SCNC: 0.7 MG/DL (ref 0.4–1)
EOSINOPHIL # BLD AUTO: 0.1 10^3/UL (ref 0–0.7)
EOSINOPHIL NFR BLD AUTO: 1.2 %
ERYTHROCYTE [DISTWIDTH] IN BLOOD BY AUTOMATED COUNT: 13.3 % (ref 12–15)
GFRSERPLBLD MDRD-ARVRAT: 93 ML/MIN/{1.73_M2} (ref 89–?)
GLOBULIN SER-MCNC: 3.3 G/DL (ref 2.1–4.2)
GLUCOSE SERPL-MCNC: 100 MG/DL (ref 70–100)
HDLC SERPL-MCNC: 33 MG/DL
HDLC SERPL: 5.2 {RATIO} (ref ?–4.4)
HGB UR QL STRIP: 14.2 G/DL (ref 12–16)
LDLC SERPL CALC-MCNC: 104 MG/DL
LDLC/HDLC SERPL: 3.2 {RATIO} (ref ?–4.4)
LYMPHOCYTES # SPEC AUTO: 2.1 10^3/UL (ref 1.5–3.5)
LYMPHOCYTES NFR BLD AUTO: 25.9 %
MCH RBC QN AUTO: 30.4 PG (ref 27–31)
MCHC RBC AUTO-ENTMCNC: 33.1 G/DL (ref 32–36)
MCV RBC AUTO: 91.9 FL (ref 81–99)
MONOCYTES # BLD AUTO: 0.4 10^3/UL (ref 0–1)
MONOCYTES NFR BLD AUTO: 4.3 %
NEUTROPHILS # BLD AUTO: 5.6 10^3/UL (ref 1.5–6.6)
NEUTROPHILS # SNV AUTO: 8.2 X10^3/UL (ref 4.8–10.8)
NEUTROPHILS NFR BLD AUTO: 67.9 %
PDW BLD AUTO: 11.9 FL (ref 7.9–10.8)
PLATELET # BLD: 209 10^3/UL (ref 130–450)
PROT SPEC-MCNC: 7.4 G/DL (ref 6.7–8.2)
RBC MAR: 4.68 10^6/UL (ref 4.2–5.4)
RHEUMATOID FACT SER QL: NEGATIVE
SODIUM SERPLBLD-SCNC: 139 MMOL/L (ref 135–145)
VLDLC SERPL-SCNC: 36 MG/DL

## 2019-06-06 PROCEDURE — 36415 COLL VENOUS BLD VENIPUNCTURE: CPT

## 2019-06-06 PROCEDURE — 83721 ASSAY OF BLOOD LIPOPROTEIN: CPT

## 2019-06-06 PROCEDURE — 80061 LIPID PANEL: CPT

## 2019-06-06 PROCEDURE — 85025 COMPLETE CBC W/AUTO DIFF WBC: CPT

## 2019-06-06 PROCEDURE — 80053 COMPREHEN METABOLIC PANEL: CPT

## 2019-06-06 PROCEDURE — 86038 ANTINUCLEAR ANTIBODIES: CPT

## 2019-06-06 PROCEDURE — 86430 RHEUMATOID FACTOR TEST QUAL: CPT

## 2019-06-10 LAB — ANA SER QL: POSITIVE

## 2019-06-25 ENCOUNTER — HOSPITAL ENCOUNTER (OUTPATIENT)
Dept: HOSPITAL 76 - DI | Age: 39
Discharge: HOME | End: 2019-06-25
Attending: SURGERY
Payer: MEDICAID

## 2019-06-25 DIAGNOSIS — R10.32: Primary | ICD-10-CM

## 2019-06-25 PROCEDURE — 76830 TRANSVAGINAL US NON-OB: CPT

## 2019-06-25 PROCEDURE — 76856 US EXAM PELVIC COMPLETE: CPT

## 2019-06-25 NOTE — ULTRASOUND REPORT
Reason:  ABDOMINAL PAIN,LEFT LOWER QUADRANT

Procedure Date:  06/25/2019   

Accession Number:  [id number] / [id number]                    

Procedure:  US  - Pelvic w/Transvaginal CPT Code:  

 

FULL RESULT:

 

 

EXAM:

PELVIC ULTRASOUND

 

EXAM DATE: 6/25/2019 03:16 PM.

 

CLINICAL HISTORY: Left lower quadrant abdominal pain.

 

COMPARISON: None.

 

TECHNIQUE: Realtime transabdominal pelvic scan performed to identify the 

uterus and adnexa and as an overview of other pelvic structures, followed 

by transvaginal scan to provide greater detail of the uterus and adnexa, 

with static image documentation.

 

FINDINGS:

Uterus: 6.7 x 3.4 x 3.9 cm, volume 46 cc. Anteverted position. Normal 

overall size and echotexture.

Masses: None.

Endometrium: 6 mm. Normal.

Cervix: Unremarkable.

 

Right Ovary: 3.0 x 1.5 x 1.6 cm, volume 3.7 cc. Normal echotexture and 

blood flow.

Left Ovary: 2.9 x 1.9 x 2.6 cm, volume 7.4 cc. Normal echotexture and 

blood flow.

 

Free Fluid: None.

 

Other: None.

IMPRESSION: Normal pelvic ultrasound. No etiology for pain identified.

 

RADIA

## 2019-07-02 ENCOUNTER — HOSPITAL ENCOUNTER (OUTPATIENT)
Dept: HOSPITAL 76 - LAB.S | Age: 39
Discharge: HOME | End: 2019-07-02
Attending: DERMATOLOGY
Payer: MEDICAID

## 2019-07-02 DIAGNOSIS — R00.2: ICD-10-CM

## 2019-07-02 DIAGNOSIS — L40.9: Primary | ICD-10-CM

## 2019-07-02 DIAGNOSIS — R60.1: ICD-10-CM

## 2019-07-02 DIAGNOSIS — R10.9: ICD-10-CM

## 2019-07-02 DIAGNOSIS — R79.89: ICD-10-CM

## 2019-07-02 DIAGNOSIS — R10.32: ICD-10-CM

## 2019-07-02 LAB
ALBUMIN DIAFP-MCNC: 4.2 G/DL (ref 3.2–5.5)
ALBUMIN/GLOB SERPL: 1.4 {RATIO} (ref 1–2.2)
ALP SERPL-CCNC: 59 IU/L (ref 42–121)
ALT SERPL W P-5'-P-CCNC: 26 IU/L (ref 10–60)
AMYLASE SERPL-CCNC: 53 U/L (ref 28–100)
ANION GAP SERPL CALCULATED.4IONS-SCNC: 11 MMOL/L (ref 6–13)
AST SERPL W P-5'-P-CCNC: 20 IU/L (ref 10–42)
BASOPHILS NFR BLD AUTO: 0 10^3/UL (ref 0–0.1)
BASOPHILS NFR BLD AUTO: 0.4 %
BILIRUB BLD-MCNC: 0.6 MG/DL (ref 0.2–1)
BUN SERPL-MCNC: 12 MG/DL (ref 6–20)
CALCIUM UR-MCNC: 8.7 MG/DL (ref 8.5–10.3)
CHLORIDE SERPL-SCNC: 108 MMOL/L (ref 101–111)
CLARITY UR REFRACT.AUTO: (no result)
CO2 SERPL-SCNC: 21 MMOL/L (ref 21–32)
CREAT SERPLBLD-SCNC: 0.7 MG/DL (ref 0.4–1)
CRP SERPL-MCNC: < 1 MG/DL (ref 0–1)
EOSINOPHIL # BLD AUTO: 0.1 10^3/UL (ref 0–0.7)
EOSINOPHIL NFR BLD AUTO: 1 %
ERYTHROCYTE [DISTWIDTH] IN BLOOD BY AUTOMATED COUNT: 12.8 % (ref 12–15)
GFRSERPLBLD MDRD-ARVRAT: 93 ML/MIN/{1.73_M2} (ref 89–?)
GLOBULIN SER-MCNC: 3 G/DL (ref 2.1–4.2)
GLUCOSE SERPL-MCNC: 90 MG/DL (ref 70–100)
GLUCOSE UR QL STRIP.AUTO: NEGATIVE MG/DL
HGB UR QL STRIP: 12.9 G/DL (ref 12–16)
KETONES UR QL STRIP.AUTO: NEGATIVE MG/DL
LIPASE SERPL-CCNC: 38 U/L (ref 22–51)
LYMPHOCYTES # SPEC AUTO: 2.4 10^3/UL (ref 1.5–3.5)
LYMPHOCYTES NFR BLD AUTO: 31.8 %
MCH RBC QN AUTO: 29.7 PG (ref 27–31)
MCHC RBC AUTO-ENTMCNC: 31.2 G/DL (ref 32–36)
MCV RBC AUTO: 95.4 FL (ref 81–99)
MONOCYTES # BLD AUTO: 0.4 10^3/UL (ref 0–1)
MONOCYTES NFR BLD AUTO: 4.6 %
NEUTROPHILS # BLD AUTO: 4.7 10^3/UL (ref 1.5–6.6)
NEUTROPHILS # SNV AUTO: 7.6 X10^3/UL (ref 4.8–10.8)
NEUTROPHILS NFR BLD AUTO: 61.9 %
NITRITE UR QL STRIP.AUTO: NEGATIVE
PDW BLD AUTO: 14 FL (ref 7.9–10.8)
PH UR STRIP.AUTO: 6 PH (ref 5–7.5)
PLATELET # BLD: 159 10^3/UL (ref 130–450)
PROT SPEC-MCNC: 7.2 G/DL (ref 6.7–8.2)
PROT UR STRIP.AUTO-MCNC: NEGATIVE MG/DL
RBC # UR STRIP.AUTO: NEGATIVE /UL
RBC # URNS HPF: (no result) /HPF (ref 0–5)
RBC MAR: 4.34 10^6/UL (ref 4.2–5.4)
SODIUM SERPLBLD-SCNC: 140 MMOL/L (ref 135–145)
SP GR UR STRIP.AUTO: 1.02 (ref 1–1.03)
SQUAMOUS URNS QL MICRO: (no result)
UROBILINOGEN UR QL STRIP.AUTO: (no result) E.U./DL
UROBILINOGEN UR STRIP.AUTO-MCNC: NEGATIVE MG/DL

## 2019-07-02 PROCEDURE — 82150 ASSAY OF AMYLASE: CPT

## 2019-07-02 PROCEDURE — 86147 CARDIOLIPIN ANTIBODY EA IG: CPT

## 2019-07-02 PROCEDURE — 81001 URINALYSIS AUTO W/SCOPE: CPT

## 2019-07-02 PROCEDURE — 85730 THROMBOPLASTIN TIME PARTIAL: CPT

## 2019-07-02 PROCEDURE — 86225 DNA ANTIBODY NATIVE: CPT

## 2019-07-02 PROCEDURE — 86140 C-REACTIVE PROTEIN: CPT

## 2019-07-02 PROCEDURE — 85613 RUSSELL VIPER VENOM DILUTED: CPT

## 2019-07-02 PROCEDURE — 86146 BETA-2 GLYCOPROTEIN ANTIBODY: CPT

## 2019-07-02 PROCEDURE — 83516 IMMUNOASSAY NONANTIBODY: CPT

## 2019-07-02 PROCEDURE — 83690 ASSAY OF LIPASE: CPT

## 2019-07-02 PROCEDURE — 86235 NUCLEAR ANTIGEN ANTIBODY: CPT

## 2019-07-02 PROCEDURE — 85651 RBC SED RATE NONAUTOMATED: CPT

## 2019-07-02 PROCEDURE — 85025 COMPLETE CBC W/AUTO DIFF WBC: CPT

## 2019-07-02 PROCEDURE — 80053 COMPREHEN METABOLIC PANEL: CPT

## 2019-07-02 PROCEDURE — 36415 COLL VENOUS BLD VENIPUNCTURE: CPT

## 2019-07-02 PROCEDURE — 86039 ANTINUCLEAR ANTIBODIES (ANA): CPT

## 2019-07-02 PROCEDURE — 81599 UNLISTED MAAA: CPT

## 2019-07-02 PROCEDURE — 86038 ANTINUCLEAR ANTIBODIES: CPT

## 2019-07-05 LAB — DSDNA AB SER-ACNC: 1 IU/ML

## 2019-07-06 LAB — SMOOTH MUSCLE IGG AB: <20 U

## 2019-07-08 LAB — ANA SER QL: POSITIVE

## 2019-07-10 LAB
B2 GLYCOPROT1 IGA SER-ACNC: <9
B2 GLYCOPROT1 IGG SER-ACNC: 17
B2 GLYCOPROT1 IGM SER-ACNC: <9
CARDIOLIPIN AB IGA: <11
CARDIOLIPIN AB IGG: <14
CARDIOLIPIN AB IGM: <12
SCREEN APTT: 31 S

## 2019-11-01 ENCOUNTER — HOSPITAL ENCOUNTER (EMERGENCY)
Dept: HOSPITAL 76 - ED | Age: 39
Discharge: HOME | End: 2019-11-01
Payer: COMMERCIAL

## 2019-11-01 ENCOUNTER — HOSPITAL ENCOUNTER (OUTPATIENT)
Dept: HOSPITAL 76 - EMS | Age: 39
Discharge: ANOTHER HOSPITAL | End: 2019-11-01
Attending: SURGERY
Payer: COMMERCIAL

## 2019-11-01 VITALS — DIASTOLIC BLOOD PRESSURE: 84 MMHG | SYSTOLIC BLOOD PRESSURE: 127 MMHG

## 2019-11-01 DIAGNOSIS — Z04.1: Primary | ICD-10-CM
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
## 2019-11-01 DIAGNOSIS — R51: Primary | ICD-10-CM

## 2019-11-01 DIAGNOSIS — M54.2: ICD-10-CM

## 2019-11-01 DIAGNOSIS — M25.512: ICD-10-CM

## 2019-11-01 DIAGNOSIS — R51: ICD-10-CM

## 2019-11-01 DIAGNOSIS — I10: ICD-10-CM
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
## 2019-11-01 LAB
ALBUMIN DIAFP-MCNC: 4.1 G/DL (ref 3.2–5.5)
ALBUMIN/GLOB SERPL: 1.4 {RATIO} (ref 1–2.2)
ALP SERPL-CCNC: 62 IU/L (ref 42–121)
ALT SERPL W P-5'-P-CCNC: 20 IU/L (ref 10–60)
ANION GAP SERPL CALCULATED.4IONS-SCNC: 9 MMOL/L (ref 6–13)
AST SERPL W P-5'-P-CCNC: 20 IU/L (ref 10–42)
B-HCG SERPL QL: NEGATIVE
BASOPHILS NFR BLD AUTO: 0 10^3/UL (ref 0–0.1)
BASOPHILS NFR BLD AUTO: 0.3 %
BILIRUB BLD-MCNC: 0.6 MG/DL (ref 0.2–1)
BUN SERPL-MCNC: 14 MG/DL (ref 6–20)
CALCIUM UR-MCNC: 8.7 MG/DL (ref 8.5–10.3)
CHLORIDE SERPL-SCNC: 105 MMOL/L (ref 101–111)
CO2 SERPL-SCNC: 24 MMOL/L (ref 21–32)
CREAT SERPLBLD-SCNC: 0.8 MG/DL (ref 0.4–1)
EOSINOPHIL # BLD AUTO: 0 10^3/UL (ref 0–0.7)
EOSINOPHIL NFR BLD AUTO: 0.4 %
ERYTHROCYTE [DISTWIDTH] IN BLOOD BY AUTOMATED COUNT: 12.5 % (ref 12–15)
GFRSERPLBLD MDRD-ARVRAT: 80 ML/MIN/{1.73_M2} (ref 89–?)
GLOBULIN SER-MCNC: 3 G/DL (ref 2.1–4.2)
GLUCOSE SERPL-MCNC: 107 MG/DL (ref 70–100)
HGB UR QL STRIP: 12.6 G/DL (ref 12–16)
INR PPP: 1.1 (ref 0.8–1.2)
LIPASE SERPL-CCNC: 37 U/L (ref 22–51)
LYMPHOCYTES # SPEC AUTO: 2.2 10^3/UL (ref 1.5–3.5)
LYMPHOCYTES NFR BLD AUTO: 21.7 %
MCH RBC QN AUTO: 30.3 PG (ref 27–31)
MCHC RBC AUTO-ENTMCNC: 33 G/DL (ref 32–36)
MCV RBC AUTO: 91.8 FL (ref 81–99)
MONOCYTES # BLD AUTO: 0.5 10^3/UL (ref 0–1)
MONOCYTES NFR BLD AUTO: 4.8 %
NEUTROPHILS # BLD AUTO: 7.3 10^3/UL (ref 1.5–6.6)
NEUTROPHILS # SNV AUTO: 10 X10^3/UL (ref 4.8–10.8)
NEUTROPHILS NFR BLD AUTO: 72.4 %
PDW BLD AUTO: 12.3 FL (ref 7.9–10.8)
PLATELET # BLD: 194 10^3/UL (ref 130–450)
PROT SPEC-MCNC: 7.1 G/DL (ref 6.7–8.2)
PROTHROM ACT/NOR PPP: 12.9 SECS (ref 9.9–12.6)
RBC MAR: 4.16 10^6/UL (ref 4.2–5.4)
SODIUM SERPLBLD-SCNC: 138 MMOL/L (ref 135–145)

## 2019-11-01 PROCEDURE — 83690 ASSAY OF LIPASE: CPT

## 2019-11-01 PROCEDURE — 80053 COMPREHEN METABOLIC PANEL: CPT

## 2019-11-01 PROCEDURE — 85610 PROTHROMBIN TIME: CPT

## 2019-11-01 PROCEDURE — 85025 COMPLETE CBC W/AUTO DIFF WBC: CPT

## 2019-11-01 PROCEDURE — 71250 CT THORAX DX C-: CPT

## 2019-11-01 PROCEDURE — 99284 EMERGENCY DEPT VISIT MOD MDM: CPT

## 2019-11-01 PROCEDURE — 72125 CT NECK SPINE W/O DYE: CPT

## 2019-11-01 PROCEDURE — 84703 CHORIONIC GONADOTROPIN ASSAY: CPT

## 2019-11-01 PROCEDURE — 99282 EMERGENCY DEPT VISIT SF MDM: CPT

## 2019-11-01 PROCEDURE — 36415 COLL VENOUS BLD VENIPUNCTURE: CPT

## 2019-11-01 PROCEDURE — 96374 THER/PROPH/DIAG INJ IV PUSH: CPT

## 2019-11-01 PROCEDURE — 73502 X-RAY EXAM HIP UNI 2-3 VIEWS: CPT

## 2019-11-01 PROCEDURE — 70450 CT HEAD/BRAIN W/O DYE: CPT
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
## 2020-02-13 ENCOUNTER — HOSPITAL ENCOUNTER (OUTPATIENT)
Dept: HOSPITAL 76 - LAB.S | Age: 40
Discharge: HOME | End: 2020-02-13
Attending: NURSE PRACTITIONER
Payer: MEDICAID

## 2020-02-13 DIAGNOSIS — S33.6XXA: ICD-10-CM

## 2020-02-13 DIAGNOSIS — M54.16: ICD-10-CM

## 2020-02-13 DIAGNOSIS — S33.5XXA: Primary | ICD-10-CM

## 2020-02-13 LAB
ANION GAP SERPL CALCULATED.4IONS-SCNC: 10 MMOL/L (ref 6–13)
BUN SERPL-MCNC: 12 MG/DL (ref 6–20)
CALCIUM UR-MCNC: 9 MG/DL (ref 8.5–10.3)
CHLORIDE SERPL-SCNC: 106 MMOL/L (ref 101–111)
CO2 SERPL-SCNC: 21 MMOL/L (ref 21–32)
CREAT SERPLBLD-SCNC: 0.7 MG/DL (ref 0.4–1)
GFRSERPLBLD MDRD-ARVRAT: 93 ML/MIN/{1.73_M2} (ref 89–?)
GLUCOSE SERPL-MCNC: 90 MG/DL (ref 70–100)
SODIUM SERPLBLD-SCNC: 137 MMOL/L (ref 135–145)

## 2020-02-13 PROCEDURE — 80048 BASIC METABOLIC PNL TOTAL CA: CPT

## 2020-02-13 PROCEDURE — 36415 COLL VENOUS BLD VENIPUNCTURE: CPT

## 2020-02-19 ENCOUNTER — HOSPITAL ENCOUNTER (OUTPATIENT)
Dept: HOSPITAL 76 - DI | Age: 40
Discharge: HOME | End: 2020-02-19
Attending: NURSE PRACTITIONER
Payer: MEDICAID

## 2020-02-19 DIAGNOSIS — S33.6XXA: ICD-10-CM

## 2020-02-19 DIAGNOSIS — M54.16: ICD-10-CM

## 2020-02-19 DIAGNOSIS — S33.5XXA: Primary | ICD-10-CM

## 2020-02-19 PROCEDURE — 72158 MRI LUMBAR SPINE W/O & W/DYE: CPT

## 2020-02-19 RX ADMIN — GADOBUTROL ONE MMOL: 604.72 INJECTION INTRAVENOUS at 14:32
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
## 2020-10-20 ENCOUNTER — HOSPITAL ENCOUNTER (OUTPATIENT)
Dept: HOSPITAL 76 - COV | Age: 40
Discharge: HOME | End: 2020-10-20
Attending: FAMILY MEDICINE
Payer: MEDICAID

## 2020-10-20 DIAGNOSIS — R09.81: ICD-10-CM

## 2020-10-20 DIAGNOSIS — R19.7: ICD-10-CM

## 2020-10-20 DIAGNOSIS — R05: Primary | ICD-10-CM

## 2020-10-20 DIAGNOSIS — Z20.828: ICD-10-CM

## 2020-10-20 DIAGNOSIS — M79.10: ICD-10-CM

## 2020-10-20 DIAGNOSIS — J02.9: ICD-10-CM

## 2021-04-07 ENCOUNTER — HOSPITAL ENCOUNTER (OUTPATIENT)
Dept: HOSPITAL 76 - LAB.S | Age: 41
Discharge: HOME | End: 2021-04-07
Attending: INTERNAL MEDICINE
Payer: MEDICAID

## 2021-04-07 DIAGNOSIS — L40.50: Primary | ICD-10-CM

## 2021-04-07 LAB
ALBUMIN DIAFP-MCNC: 4.5 G/DL (ref 3.2–5.5)
ALBUMIN/GLOB SERPL: 1.4 {RATIO} (ref 1–2.2)
ALP SERPL-CCNC: 71 IU/L (ref 42–121)
ALT SERPL W P-5'-P-CCNC: 41 IU/L (ref 10–60)
ANION GAP SERPL CALCULATED.4IONS-SCNC: 10 MMOL/L (ref 6–13)
AST SERPL W P-5'-P-CCNC: 33 IU/L (ref 10–42)
BASOPHILS NFR BLD AUTO: 0 10^3/UL (ref 0–0.1)
BASOPHILS NFR BLD AUTO: 0.3 %
BILIRUB BLD-MCNC: 0.7 MG/DL (ref 0.2–1)
BUN SERPL-MCNC: 14 MG/DL (ref 6–20)
CALCIUM UR-MCNC: 9.6 MG/DL (ref 8.5–10.3)
CHLORIDE SERPL-SCNC: 104 MMOL/L (ref 101–111)
CO2 SERPL-SCNC: 24 MMOL/L (ref 21–32)
CREAT SERPLBLD-SCNC: 0.7 MG/DL (ref 0.4–1)
CRP SERPL-MCNC: < 1 MG/DL (ref 0–1)
EOSINOPHIL # BLD AUTO: 0.1 10^3/UL (ref 0–0.7)
EOSINOPHIL NFR BLD AUTO: 1.4 %
ERYTHROCYTE [DISTWIDTH] IN BLOOD BY AUTOMATED COUNT: 12.6 % (ref 12–15)
GFRSERPLBLD MDRD-ARVRAT: 93 ML/MIN/{1.73_M2} (ref 89–?)
GLOBULIN SER-MCNC: 3.2 G/DL (ref 2.1–4.2)
GLUCOSE SERPL-MCNC: 94 MG/DL (ref 70–100)
HCT VFR BLD AUTO: 46.1 % (ref 37–47)
HGB UR QL STRIP: 14.8 G/DL (ref 12–16)
LYMPHOCYTES # SPEC AUTO: 3 10^3/UL (ref 1.5–3.5)
LYMPHOCYTES NFR BLD AUTO: 29.1 %
MCH RBC QN AUTO: 30.3 PG (ref 27–31)
MCHC RBC AUTO-ENTMCNC: 32.1 G/DL (ref 32–36)
MCV RBC AUTO: 94.3 FL (ref 81–99)
MONOCYTES # BLD AUTO: 0.5 10^3/UL (ref 0–1)
MONOCYTES NFR BLD AUTO: 5.1 %
NEUTROPHILS # BLD AUTO: 6.5 10^3/UL (ref 1.5–6.6)
NEUTROPHILS # SNV AUTO: 10.3 X10^3/UL (ref 4.8–10.8)
NEUTROPHILS NFR BLD AUTO: 63.6 %
NRBC # BLD AUTO: 0 /100WBC
NRBC # BLD AUTO: 0 X10^3/UL
PDW BLD AUTO: 13.1 FL (ref 7.9–10.8)
PLATELET # BLD: 251 10^3/UL (ref 130–450)
POTASSIUM SERPL-SCNC: 4.2 MMOL/L (ref 3.5–5)
PROT SPEC-MCNC: 7.7 G/DL (ref 6.7–8.2)
RBC MAR: 4.89 10^6/UL (ref 4.2–5.4)
SODIUM SERPLBLD-SCNC: 138 MMOL/L (ref 135–145)

## 2021-04-07 PROCEDURE — 80053 COMPREHEN METABOLIC PANEL: CPT

## 2021-04-07 PROCEDURE — 86140 C-REACTIVE PROTEIN: CPT

## 2021-04-07 PROCEDURE — 36415 COLL VENOUS BLD VENIPUNCTURE: CPT

## 2021-04-07 PROCEDURE — 85651 RBC SED RATE NONAUTOMATED: CPT

## 2021-04-07 PROCEDURE — 85025 COMPLETE CBC W/AUTO DIFF WBC: CPT

## 2021-04-13 ENCOUNTER — HOSPITAL ENCOUNTER (OUTPATIENT)
Dept: HOSPITAL 76 - DI | Age: 41
Discharge: HOME | End: 2021-04-13
Attending: NURSE PRACTITIONER
Payer: MEDICAID

## 2021-04-13 DIAGNOSIS — E04.1: Primary | ICD-10-CM

## 2021-04-14 NOTE — ULTRASOUND REPORT
PROCEDURE:  Head or Neck Soft Tissue

 

INDICATIONS:  NECK MASS

 

TECHNIQUE:  Real time scanning was performed of the neck region of interest, with image documentation
.  

 

COMPARISON:  None.

 

FINDINGS: There is no neck mass identified outside of the thyroid. The thyroid was imaged as part of 
this study.

 

Right lobe of the thyroid measures 4.6 x 1.6 x 1.8 cm. Left lobe of the thyroid measures 4.7 x 1.2 x 
1.4 cm.

 

Both lobes are homogeneous in appearance, other than small nodules.

 

On the right, there is a small solid nodule measuring 0.4 x 0.3 x 0.5 cm. The nodule is solid, hypoec
hoic, with smooth margins, and no echogenic foci. It is wider than tall. This is a moderately suspici
ous nodule, but based on size, no further follow-up is required.

 

On the left, there is a less than 0.5 cm cystic nodule. This is a benign nodule. No further follow-up
 required.

 

IMPRESSION:  

1. No evidence of suspicious neck mass.

2. Note made of 0.5 cm maximum diameter right thyroid nodule. Based on size, no further follow-up req
uired.

 

Reviewed by: Anthony Tobias MD on 4/14/2021 9:23 AM PDT

Approved by: Anthony Tobias MD on 4/14/2021 9:23 AM PDT

 

 

Station ID:  [id number]

## 2021-04-23 ENCOUNTER — HOSPITAL ENCOUNTER (OUTPATIENT)
Dept: HOSPITAL 76 - LAB.S | Age: 41
Discharge: HOME | End: 2021-04-23
Attending: NURSE PRACTITIONER
Payer: MEDICAID

## 2021-04-23 DIAGNOSIS — M32.9: Primary | ICD-10-CM

## 2021-04-23 DIAGNOSIS — K04.7: ICD-10-CM

## 2021-04-23 DIAGNOSIS — G43.909: ICD-10-CM

## 2021-04-23 DIAGNOSIS — R76.8: ICD-10-CM

## 2021-04-23 DIAGNOSIS — L40.50: ICD-10-CM

## 2021-04-23 DIAGNOSIS — R21: ICD-10-CM

## 2021-04-23 DIAGNOSIS — I10: ICD-10-CM

## 2021-04-23 LAB
ALBUMIN DIAFP-MCNC: 4.2 G/DL (ref 3.2–5.5)
ALBUMIN/GLOB SERPL: 1.5 {RATIO} (ref 1–2.2)
ALP SERPL-CCNC: 64 IU/L (ref 42–121)
ALT SERPL W P-5'-P-CCNC: 30 IU/L (ref 10–60)
ANION GAP SERPL CALCULATED.4IONS-SCNC: 9 MMOL/L (ref 6–13)
AST SERPL W P-5'-P-CCNC: 26 IU/L (ref 10–42)
BASOPHILS NFR BLD AUTO: 0 10^3/UL (ref 0–0.1)
BASOPHILS NFR BLD AUTO: 0.4 %
BILIRUB BLD-MCNC: 1 MG/DL (ref 0.2–1)
BUN SERPL-MCNC: 10 MG/DL (ref 6–20)
CALCIUM UR-MCNC: 9 MG/DL (ref 8.5–10.3)
CHLORIDE SERPL-SCNC: 105 MMOL/L (ref 101–111)
CHOLEST SERPL-MCNC: 212 MG/DL
CO2 SERPL-SCNC: 21 MMOL/L (ref 21–32)
CREAT SERPLBLD-SCNC: 0.7 MG/DL (ref 0.4–1)
EOSINOPHIL # BLD AUTO: 0.1 10^3/UL (ref 0–0.7)
EOSINOPHIL NFR BLD AUTO: 1.1 %
ERYTHROCYTE [DISTWIDTH] IN BLOOD BY AUTOMATED COUNT: 12.8 % (ref 12–15)
GFRSERPLBLD MDRD-ARVRAT: 92 ML/MIN/{1.73_M2} (ref 89–?)
GLOBULIN SER-MCNC: 2.8 G/DL (ref 2.1–4.2)
GLUCOSE SERPL-MCNC: 91 MG/DL (ref 70–100)
HCT VFR BLD AUTO: 45.3 % (ref 37–47)
HDLC SERPL-MCNC: 39 MG/DL
HDLC SERPL: 5.4 {RATIO} (ref ?–4.4)
HGB UR QL STRIP: 14.3 G/DL (ref 12–16)
LDLC SERPL CALC-MCNC: 147 MG/DL
LDLC/HDLC SERPL: 3.8 {RATIO} (ref ?–4.4)
LYMPHOCYTES # SPEC AUTO: 2.4 10^3/UL (ref 1.5–3.5)
LYMPHOCYTES NFR BLD AUTO: 32.3 %
MCH RBC QN AUTO: 31.7 PG (ref 27–31)
MCHC RBC AUTO-ENTMCNC: 31.6 G/DL (ref 32–36)
MCV RBC AUTO: 100.4 FL (ref 81–99)
MONOCYTES # BLD AUTO: 0.4 10^3/UL (ref 0–1)
MONOCYTES NFR BLD AUTO: 4.8 %
NEUTROPHILS # BLD AUTO: 4.5 10^3/UL (ref 1.5–6.6)
NEUTROPHILS # SNV AUTO: 7.3 X10^3/UL (ref 4.8–10.8)
NEUTROPHILS NFR BLD AUTO: 61.1 %
NRBC # BLD AUTO: 0 /100WBC
NRBC # BLD AUTO: 0 X10^3/UL
PDW BLD AUTO: 12.4 FL (ref 7.9–10.8)
PLATELET # BLD: 214 10^3/UL (ref 130–450)
POTASSIUM SERPL-SCNC: 4.4 MMOL/L (ref 3.5–5)
PROT SPEC-MCNC: 7 G/DL (ref 6.7–8.2)
RBC MAR: 4.51 10^6/UL (ref 4.2–5.4)
SODIUM SERPLBLD-SCNC: 135 MMOL/L (ref 135–145)
TRIGL P FAST SERPL-MCNC: 129 MG/DL
TSH SERPL-ACNC: 1.89 UIU/ML (ref 0.34–5.6)
VLDLC SERPL-SCNC: 26 MG/DL

## 2021-04-23 PROCEDURE — 85025 COMPLETE CBC W/AUTO DIFF WBC: CPT

## 2021-04-23 PROCEDURE — 80053 COMPREHEN METABOLIC PANEL: CPT

## 2021-04-23 PROCEDURE — 36415 COLL VENOUS BLD VENIPUNCTURE: CPT

## 2021-04-23 PROCEDURE — 83721 ASSAY OF BLOOD LIPOPROTEIN: CPT

## 2021-04-23 PROCEDURE — 84443 ASSAY THYROID STIM HORMONE: CPT

## 2021-04-23 PROCEDURE — 80061 LIPID PANEL: CPT

## 2021-07-28 ENCOUNTER — HOSPITAL ENCOUNTER (OUTPATIENT)
Dept: HOSPITAL 76 - LAB.S | Age: 41
Discharge: HOME | End: 2021-07-28
Attending: INTERNAL MEDICINE
Payer: MEDICAID

## 2021-07-28 DIAGNOSIS — L40.50: Primary | ICD-10-CM

## 2021-07-28 LAB
ALBUMIN DIAFP-MCNC: 4.3 G/DL (ref 3.2–5.5)
ALBUMIN/GLOB SERPL: 1.4 {RATIO} (ref 1–2.2)
ALP SERPL-CCNC: 72 IU/L (ref 42–121)
ALT SERPL W P-5'-P-CCNC: 33 IU/L (ref 10–60)
ANION GAP SERPL CALCULATED.4IONS-SCNC: 10 MMOL/L (ref 6–13)
AST SERPL W P-5'-P-CCNC: 23 IU/L (ref 10–42)
BASOPHILS NFR BLD AUTO: 0 10^3/UL (ref 0–0.1)
BASOPHILS NFR BLD AUTO: 0.4 %
BILIRUB BLD-MCNC: 0.8 MG/DL (ref 0.2–1)
BUN SERPL-MCNC: 11 MG/DL (ref 6–20)
CALCIUM UR-MCNC: 9.2 MG/DL (ref 8.5–10.3)
CHLORIDE SERPL-SCNC: 107 MMOL/L (ref 101–111)
CO2 SERPL-SCNC: 21 MMOL/L (ref 21–32)
CREAT SERPLBLD-SCNC: 0.7 MG/DL (ref 0.4–1)
CRP SERPL-MCNC: < 1 MG/DL (ref 0–1)
EOSINOPHIL # BLD AUTO: 0.1 10^3/UL (ref 0–0.7)
EOSINOPHIL NFR BLD AUTO: 1.2 %
ERYTHROCYTE [DISTWIDTH] IN BLOOD BY AUTOMATED COUNT: 13.2 % (ref 12–15)
GFRSERPLBLD MDRD-ARVRAT: 92 ML/MIN/{1.73_M2} (ref 89–?)
GLOBULIN SER-MCNC: 3 G/DL (ref 2.1–4.2)
GLUCOSE SERPL-MCNC: 100 MG/DL (ref 70–100)
HCT VFR BLD AUTO: 44.3 % (ref 37–47)
HGB UR QL STRIP: 14.2 G/DL (ref 12–16)
LYMPHOCYTES # SPEC AUTO: 2.5 10^3/UL (ref 1.5–3.5)
LYMPHOCYTES NFR BLD AUTO: 28.3 %
MCH RBC QN AUTO: 30.7 PG (ref 27–31)
MCHC RBC AUTO-ENTMCNC: 32.1 G/DL (ref 32–36)
MCV RBC AUTO: 95.7 FL (ref 81–99)
MONOCYTES # BLD AUTO: 0.5 10^3/UL (ref 0–1)
MONOCYTES NFR BLD AUTO: 6.1 %
NEUTROPHILS # BLD AUTO: 5.7 10^3/UL (ref 1.5–6.6)
NEUTROPHILS # SNV AUTO: 8.9 X10^3/UL (ref 4.8–10.8)
NEUTROPHILS NFR BLD AUTO: 63.7 %
NRBC # BLD AUTO: 0 /100WBC
NRBC # BLD AUTO: 0 X10^3/UL
PDW BLD AUTO: 12.9 FL (ref 7.9–10.8)
PLATELET # BLD: 231 10^3/UL (ref 130–450)
POTASSIUM SERPL-SCNC: 4 MMOL/L (ref 3.5–5)
PROT SPEC-MCNC: 7.3 G/DL (ref 6.7–8.2)
RBC MAR: 4.63 10^6/UL (ref 4.2–5.4)
SODIUM SERPLBLD-SCNC: 138 MMOL/L (ref 135–145)

## 2021-07-28 PROCEDURE — 86140 C-REACTIVE PROTEIN: CPT

## 2021-07-28 PROCEDURE — 36415 COLL VENOUS BLD VENIPUNCTURE: CPT

## 2021-07-28 PROCEDURE — 85651 RBC SED RATE NONAUTOMATED: CPT

## 2021-07-28 PROCEDURE — 80053 COMPREHEN METABOLIC PANEL: CPT

## 2021-07-28 PROCEDURE — 85025 COMPLETE CBC W/AUTO DIFF WBC: CPT

## 2021-11-03 ENCOUNTER — HOSPITAL ENCOUNTER (OUTPATIENT)
Dept: HOSPITAL 76 - LAB.S | Age: 41
Discharge: HOME | End: 2021-11-03
Attending: INTERNAL MEDICINE
Payer: MEDICAID

## 2021-11-03 DIAGNOSIS — L40.50: Primary | ICD-10-CM

## 2021-11-03 LAB
ALBUMIN DIAFP-MCNC: 4.3 G/DL (ref 3.2–5.5)
ALBUMIN/GLOB SERPL: 1.5 {RATIO} (ref 1–2.2)
ALP SERPL-CCNC: 73 IU/L (ref 42–121)
ALT SERPL W P-5'-P-CCNC: 28 IU/L (ref 10–60)
ANION GAP SERPL CALCULATED.4IONS-SCNC: 9 MMOL/L (ref 6–13)
AST SERPL W P-5'-P-CCNC: 23 IU/L (ref 10–42)
BASOPHILS NFR BLD AUTO: 0 10^3/UL (ref 0–0.1)
BASOPHILS NFR BLD AUTO: 0.6 %
BILIRUB BLD-MCNC: 0.6 MG/DL (ref 0.2–1)
BUN SERPL-MCNC: 9 MG/DL (ref 6–20)
CALCIUM UR-MCNC: 8.9 MG/DL (ref 8.5–10.3)
CHLORIDE SERPL-SCNC: 107 MMOL/L (ref 101–111)
CO2 SERPL-SCNC: 21 MMOL/L (ref 21–32)
CREAT SERPLBLD-SCNC: 0.6 MG/DL (ref 0.4–1)
CRP SERPL-MCNC: < 1 MG/DL (ref 0–1)
EOSINOPHIL # BLD AUTO: 0.1 10^3/UL (ref 0–0.7)
EOSINOPHIL NFR BLD AUTO: 1.5 %
ERYTHROCYTE [DISTWIDTH] IN BLOOD BY AUTOMATED COUNT: 13.2 % (ref 12–15)
GFRSERPLBLD MDRD-ARVRAT: 110 ML/MIN/{1.73_M2} (ref 89–?)
GLOBULIN SER-MCNC: 2.8 G/DL (ref 2.1–4.2)
GLUCOSE SERPL-MCNC: 107 MG/DL (ref 70–100)
HCT VFR BLD AUTO: 43.7 % (ref 37–47)
HGB UR QL STRIP: 14.4 G/DL (ref 12–16)
LYMPHOCYTES # SPEC AUTO: 2 10^3/UL (ref 1.5–3.5)
LYMPHOCYTES NFR BLD AUTO: 29.8 %
MCH RBC QN AUTO: 31.7 PG (ref 27–31)
MCHC RBC AUTO-ENTMCNC: 33 G/DL (ref 32–36)
MCV RBC AUTO: 96.3 FL (ref 81–99)
MONOCYTES # BLD AUTO: 0.3 10^3/UL (ref 0–1)
MONOCYTES NFR BLD AUTO: 4.8 %
NEUTROPHILS # BLD AUTO: 4.2 10^3/UL (ref 1.5–6.6)
NEUTROPHILS # SNV AUTO: 6.6 X10^3/UL (ref 4.8–10.8)
NEUTROPHILS NFR BLD AUTO: 63 %
NRBC # BLD AUTO: 0 /100WBC
NRBC # BLD AUTO: 0 X10^3/UL
PDW BLD AUTO: 13.1 FL (ref 7.9–10.8)
PLATELET # BLD: 233 10^3/UL (ref 130–450)
POTASSIUM SERPL-SCNC: 3.9 MMOL/L (ref 3.5–5)
PROT SPEC-MCNC: 7.1 G/DL (ref 6.7–8.2)
RBC MAR: 4.54 10^6/UL (ref 4.2–5.4)
SODIUM SERPLBLD-SCNC: 137 MMOL/L (ref 135–145)

## 2021-11-03 PROCEDURE — 86480 TB TEST CELL IMMUN MEASURE: CPT

## 2021-11-03 PROCEDURE — 86140 C-REACTIVE PROTEIN: CPT

## 2021-11-03 PROCEDURE — 80053 COMPREHEN METABOLIC PANEL: CPT

## 2021-11-03 PROCEDURE — 36415 COLL VENOUS BLD VENIPUNCTURE: CPT

## 2021-11-03 PROCEDURE — 85651 RBC SED RATE NONAUTOMATED: CPT

## 2021-11-03 PROCEDURE — 85025 COMPLETE CBC W/AUTO DIFF WBC: CPT

## 2021-11-05 LAB
MITOGEN-NIL: 9.27 IU/ML
NIL: 0.01 IU/ML
TB1-NIL: 0.02 IU/ML
TB2-NIL: 0.01 IU/ML

## 2022-01-22 ENCOUNTER — HOSPITAL ENCOUNTER (OUTPATIENT)
Dept: HOSPITAL 76 - DI.S | Age: 42
Discharge: HOME | End: 2022-01-22
Attending: NURSE PRACTITIONER
Payer: MEDICAID

## 2022-01-22 DIAGNOSIS — R53.83: Primary | ICD-10-CM

## 2022-01-22 NOTE — XRAY REPORT
PROCEDURE:  Chest 2 View X-Ray

 

INDICATIONS:  FATIGUE

 

TECHNIQUE:  2 view(s) of the chest.  

 

COMPARISON:  Correlation is made with prior chest CT, 11/1/2019

 

FINDINGS:  

 

Surgical changes and devices:  None.  

 

Lungs and pleura:  No pleural effusions or pneumothorax.  Lungs are clear.  

 

Mediastinum:  Mediastinal contours are normal.  Heart size is normal.  

 

Bones and chest wall:  No suspicious bony abnormalities.  Soft tissues appear unremarkable.  

 

 

IMPRESSION:  Chest plain films within normal limits, without focal infiltrates seen.

 

Reviewed by: Cholo Dale MD on 1/22/2022 11:20 AM [facility]

Approved by: Cholo Dale MD on 1/22/2022 11:20 AM [facility]

 

 

Station ID:  [id number]

## 2022-03-08 ENCOUNTER — HOSPITAL ENCOUNTER (OUTPATIENT)
Dept: HOSPITAL 76 - LAB.S | Age: 42
Discharge: HOME | End: 2022-03-08
Attending: INTERNAL MEDICINE
Payer: MEDICAID

## 2022-03-08 DIAGNOSIS — L40.50: Primary | ICD-10-CM

## 2022-03-08 LAB
ALBUMIN DIAFP-MCNC: 4.3 G/DL (ref 3.2–5.5)
ALBUMIN/GLOB SERPL: 1.5 {RATIO} (ref 1–2.2)
ALP SERPL-CCNC: 58 IU/L (ref 42–121)
ALT SERPL W P-5'-P-CCNC: 27 IU/L (ref 10–60)
ANION GAP SERPL CALCULATED.4IONS-SCNC: 10 MMOL/L (ref 6–13)
AST SERPL W P-5'-P-CCNC: 19 IU/L (ref 10–42)
BASOPHILS NFR BLD AUTO: 0 10^3/UL (ref 0–0.1)
BASOPHILS NFR BLD AUTO: 0.3 %
BILIRUB BLD-MCNC: 0.5 MG/DL (ref 0.2–1)
BUN SERPL-MCNC: 8 MG/DL (ref 6–20)
CALCIUM UR-MCNC: 9 MG/DL (ref 8.5–10.3)
CHLORIDE SERPL-SCNC: 105 MMOL/L (ref 101–111)
CO2 SERPL-SCNC: 24 MMOL/L (ref 21–32)
CREAT SERPLBLD-SCNC: 0.7 MG/DL (ref 0.4–1)
CRP SERPL-MCNC: < 1 MG/DL (ref 0–1)
EOSINOPHIL # BLD AUTO: 0.1 10^3/UL (ref 0–0.7)
EOSINOPHIL NFR BLD AUTO: 1 %
ERYTHROCYTE [DISTWIDTH] IN BLOOD BY AUTOMATED COUNT: 12.7 % (ref 12–15)
GFRSERPLBLD MDRD-ARVRAT: 92 ML/MIN/{1.73_M2} (ref 89–?)
GLOBULIN SER-MCNC: 2.9 G/DL (ref 2.1–4.2)
GLUCOSE SERPL-MCNC: 97 MG/DL (ref 70–100)
HCT VFR BLD AUTO: 43.4 % (ref 37–47)
HGB UR QL STRIP: 14.3 G/DL (ref 12–16)
LYMPHOCYTES # SPEC AUTO: 2.1 10^3/UL (ref 1.5–3.5)
LYMPHOCYTES NFR BLD AUTO: 30.9 %
MCH RBC QN AUTO: 31.4 PG (ref 27–31)
MCHC RBC AUTO-ENTMCNC: 32.9 G/DL (ref 32–36)
MCV RBC AUTO: 95.2 FL (ref 81–99)
MONOCYTES # BLD AUTO: 0.4 10^3/UL (ref 0–1)
MONOCYTES NFR BLD AUTO: 5.8 %
NEUTROPHILS # BLD AUTO: 4.3 10^3/UL (ref 1.5–6.6)
NEUTROPHILS # SNV AUTO: 6.9 X10^3/UL (ref 4.8–10.8)
NEUTROPHILS NFR BLD AUTO: 61.7 %
NRBC # BLD AUTO: 0 /100WBC
NRBC # BLD AUTO: 0 X10^3/UL
PDW BLD AUTO: 13.6 FL (ref 7.9–10.8)
PLATELET # BLD: 211 10^3/UL (ref 130–450)
POTASSIUM SERPL-SCNC: 3.9 MMOL/L (ref 3.5–5)
PROT SPEC-MCNC: 7.2 G/DL (ref 6.7–8.2)
RBC MAR: 4.56 10^6/UL (ref 4.2–5.4)
SODIUM SERPLBLD-SCNC: 139 MMOL/L (ref 135–145)

## 2022-03-08 PROCEDURE — 85651 RBC SED RATE NONAUTOMATED: CPT

## 2022-03-08 PROCEDURE — 86140 C-REACTIVE PROTEIN: CPT

## 2022-03-08 PROCEDURE — 80053 COMPREHEN METABOLIC PANEL: CPT

## 2022-03-08 PROCEDURE — 36415 COLL VENOUS BLD VENIPUNCTURE: CPT

## 2022-03-08 PROCEDURE — 85025 COMPLETE CBC W/AUTO DIFF WBC: CPT

## 2022-05-26 ENCOUNTER — HOSPITAL ENCOUNTER (OUTPATIENT)
Dept: HOSPITAL 76 - LAB.S | Age: 42
Discharge: HOME | End: 2022-05-26
Attending: INTERNAL MEDICINE
Payer: MEDICAID

## 2022-05-26 DIAGNOSIS — L40.50: Primary | ICD-10-CM

## 2022-05-26 LAB
ALBUMIN DIAFP-MCNC: 4.2 G/DL (ref 3.2–5.5)
ALBUMIN/GLOB SERPL: 1.4 {RATIO} (ref 1–2.2)
ALP SERPL-CCNC: 63 IU/L (ref 42–121)
ALT SERPL W P-5'-P-CCNC: 24 IU/L (ref 10–60)
ANION GAP SERPL CALCULATED.4IONS-SCNC: 10 MMOL/L (ref 6–13)
AST SERPL W P-5'-P-CCNC: 18 IU/L (ref 10–42)
BASOPHILS NFR BLD AUTO: 0 10^3/UL (ref 0–0.1)
BASOPHILS NFR BLD AUTO: 0.3 %
BILIRUB BLD-MCNC: 0.5 MG/DL (ref 0.2–1)
BUN SERPL-MCNC: 12 MG/DL (ref 6–20)
CALCIUM UR-MCNC: 8.9 MG/DL (ref 8.5–10.3)
CHLORIDE SERPL-SCNC: 105 MMOL/L (ref 101–111)
CO2 SERPL-SCNC: 23 MMOL/L (ref 21–32)
CREAT SERPLBLD-SCNC: 0.8 MG/DL (ref 0.4–1)
CRP SERPL-MCNC: < 1 MG/DL (ref 0–1)
EOSINOPHIL # BLD AUTO: 0.1 10^3/UL (ref 0–0.7)
EOSINOPHIL NFR BLD AUTO: 1.1 %
ERYTHROCYTE [DISTWIDTH] IN BLOOD BY AUTOMATED COUNT: 13.1 % (ref 12–15)
GFRSERPLBLD MDRD-ARVRAT: 79 ML/MIN/{1.73_M2} (ref 89–?)
GLOBULIN SER-MCNC: 2.9 G/DL (ref 2.1–4.2)
GLUCOSE SERPL-MCNC: 102 MG/DL (ref 70–100)
HCT VFR BLD AUTO: 42.7 % (ref 37–47)
HGB UR QL STRIP: 13.9 G/DL (ref 12–16)
LYMPHOCYTES # SPEC AUTO: 2.5 10^3/UL (ref 1.5–3.5)
LYMPHOCYTES NFR BLD AUTO: 29 %
MCH RBC QN AUTO: 31 PG (ref 27–31)
MCHC RBC AUTO-ENTMCNC: 32.6 G/DL (ref 32–36)
MCV RBC AUTO: 95.3 FL (ref 81–99)
MONOCYTES # BLD AUTO: 0.4 10^3/UL (ref 0–1)
MONOCYTES NFR BLD AUTO: 4.6 %
NEUTROPHILS # BLD AUTO: 5.6 10^3/UL (ref 1.5–6.6)
NEUTROPHILS # SNV AUTO: 8.7 X10^3/UL (ref 4.8–10.8)
NEUTROPHILS NFR BLD AUTO: 64.8 %
NRBC # BLD AUTO: 0 /100WBC
NRBC # BLD AUTO: 0 X10^3/UL
PDW BLD AUTO: 13 FL (ref 7.9–10.8)
PLATELET # BLD: 212 10^3/UL (ref 130–450)
POTASSIUM SERPL-SCNC: 3.5 MMOL/L (ref 3.5–5)
PROT SPEC-MCNC: 7.1 G/DL (ref 6.7–8.2)
RBC MAR: 4.48 10^6/UL (ref 4.2–5.4)
SODIUM SERPLBLD-SCNC: 138 MMOL/L (ref 135–145)

## 2022-05-26 PROCEDURE — 86140 C-REACTIVE PROTEIN: CPT

## 2022-05-26 PROCEDURE — 80053 COMPREHEN METABOLIC PANEL: CPT

## 2022-05-26 PROCEDURE — 85651 RBC SED RATE NONAUTOMATED: CPT

## 2022-05-26 PROCEDURE — 85025 COMPLETE CBC W/AUTO DIFF WBC: CPT

## 2022-05-26 PROCEDURE — 36415 COLL VENOUS BLD VENIPUNCTURE: CPT

## 2022-09-27 ENCOUNTER — HOSPITAL ENCOUNTER (OUTPATIENT)
Dept: HOSPITAL 76 - LAB.S | Age: 42
Discharge: HOME | End: 2022-09-27
Attending: INTERNAL MEDICINE
Payer: MEDICAID

## 2022-09-27 DIAGNOSIS — L40.50: Primary | ICD-10-CM

## 2022-09-27 LAB
ALBUMIN DIAFP-MCNC: 4.3 G/DL (ref 3.2–5.5)
ALBUMIN/GLOB SERPL: 1.4 {RATIO} (ref 1–2.2)
ALP SERPL-CCNC: 69 IU/L (ref 42–121)
ALT SERPL W P-5'-P-CCNC: 22 IU/L (ref 10–60)
ANION GAP SERPL CALCULATED.4IONS-SCNC: 8 MMOL/L (ref 6–13)
AST SERPL W P-5'-P-CCNC: 25 IU/L (ref 10–42)
BASOPHILS NFR BLD AUTO: 0 10^3/UL (ref 0–0.1)
BASOPHILS NFR BLD AUTO: 0.1 %
BILIRUB BLD-MCNC: 0.9 MG/DL (ref 0.2–1)
BUN SERPL-MCNC: 10 MG/DL (ref 6–20)
CALCIUM UR-MCNC: 8.9 MG/DL (ref 8.5–10.3)
CHLORIDE SERPL-SCNC: 106 MMOL/L (ref 101–111)
CO2 SERPL-SCNC: 23 MMOL/L (ref 21–32)
CREAT SERPLBLD-SCNC: 0.8 MG/DL (ref 0.4–1)
CRP SERPL-MCNC: < 1 MG/DL (ref 0–1)
EOSINOPHIL # BLD AUTO: 0.1 10^3/UL (ref 0–0.7)
EOSINOPHIL NFR BLD AUTO: 0.9 %
ERYTHROCYTE [DISTWIDTH] IN BLOOD BY AUTOMATED COUNT: 12.7 % (ref 12–15)
GFRSERPLBLD MDRD-ARVRAT: 79 ML/MIN/{1.73_M2} (ref 89–?)
GLOBULIN SER-MCNC: 3.1 G/DL (ref 2.1–4.2)
GLUCOSE SERPL-MCNC: 96 MG/DL (ref 70–100)
HCT VFR BLD AUTO: 43.1 % (ref 37–47)
HGB UR QL STRIP: 14.7 G/DL (ref 12–16)
LYMPHOCYTES # SPEC AUTO: 2.4 10^3/UL (ref 1.5–3.5)
LYMPHOCYTES NFR BLD AUTO: 27.8 %
MCH RBC QN AUTO: 31.8 PG (ref 27–31)
MCHC RBC AUTO-ENTMCNC: 34.1 G/DL (ref 32–36)
MCV RBC AUTO: 93.3 FL (ref 81–99)
MONOCYTES # BLD AUTO: 0.4 10^3/UL (ref 0–1)
MONOCYTES NFR BLD AUTO: 5 %
NEUTROPHILS # BLD AUTO: 5.7 10^3/UL (ref 1.5–6.6)
NEUTROPHILS # SNV AUTO: 8.6 X10^3/UL (ref 4.8–10.8)
NEUTROPHILS NFR BLD AUTO: 66 %
NRBC # BLD AUTO: 0 /100WBC
NRBC # BLD AUTO: 0 X10^3/UL
PDW BLD AUTO: 13 FL (ref 7.9–10.8)
PLATELET # BLD: 223 10^3/UL (ref 130–450)
POTASSIUM SERPL-SCNC: 3.9 MMOL/L (ref 3.5–5)
PROT SPEC-MCNC: 7.4 G/DL (ref 6.7–8.2)
RBC MAR: 4.62 10^6/UL (ref 4.2–5.4)
SODIUM SERPLBLD-SCNC: 137 MMOL/L (ref 135–145)

## 2022-09-27 PROCEDURE — 36415 COLL VENOUS BLD VENIPUNCTURE: CPT

## 2022-09-27 PROCEDURE — 86140 C-REACTIVE PROTEIN: CPT

## 2022-09-27 PROCEDURE — 85025 COMPLETE CBC W/AUTO DIFF WBC: CPT

## 2022-09-27 PROCEDURE — 80053 COMPREHEN METABOLIC PANEL: CPT

## 2022-09-27 PROCEDURE — 85651 RBC SED RATE NONAUTOMATED: CPT

## 2022-10-11 ENCOUNTER — HOSPITAL ENCOUNTER (EMERGENCY)
Dept: HOSPITAL 76 - ED | Age: 42
Discharge: HOME | End: 2022-10-11
Payer: MEDICAID

## 2022-10-11 VITALS — SYSTOLIC BLOOD PRESSURE: 163 MMHG | DIASTOLIC BLOOD PRESSURE: 95 MMHG

## 2022-10-11 DIAGNOSIS — M54.2: ICD-10-CM
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
## 2022-10-11 DIAGNOSIS — I10: ICD-10-CM

## 2022-10-11 DIAGNOSIS — S06.9X9A: Primary | ICD-10-CM
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
## 2022-10-11 PROCEDURE — 96372 THER/PROPH/DIAG INJ SC/IM: CPT

## 2022-10-11 PROCEDURE — 99284 EMERGENCY DEPT VISIT MOD MDM: CPT

## 2022-10-11 NOTE — ED PHYSICIAN DOCUMENTATION
PD HPI HEAD INJURY





- Stated complaint


Stated Complaint: FALL/HEAD PX





- Chief complaint


Chief Complaint: Trauma Hd/Nk





- History obtained from


History obtained from: Patient, Friend





- History of Present Illness


Mechanism of head injury: Fell


Where head injury occurred: Home


Pain level max: 8


Pain level now: 8


Location of injury: Front


Quality of pain: Pain, Aching, Dull


Associated symptoms: LOC (Unknown amount of time).  No: AMS, Amnesia, Nausea / 

vomiting


Symptoms improve with: Rest


Symptoms worsen with: Palpation, Movement


Contributing factors: No: Anticoagulated, Intoxicated





- Additional information


Additional information: 





Patient tripped and fell today, striking her head on a metal bar.  Positive loss

of consciousness.  Has a headache.  Also has upper neck pain.  Worse with 

movement, better with rest.  Has not taken anything for the pain.  Is not on 

blood thinners.  Denies any possibility of pregnancy.  Occurred about an hour 

prior to arrival.





Review of Systems


Ten Systems: 10 systems reviewed and negative


Constitutional: denies: Fever, Chills


Ears: denies: Ear pain


Nose: denies: Rhinorrhea / runny nose


Respiratory: denies: Cough


GI: denies: Nausea, Vomiting, Diarrhea


Skin: denies: Rash


Musculoskeletal: denies: Back pain


Neurologic: denies: Focal weakness, Numbness, Seizure, Confused





PD PAST MEDICAL HISTORY





- Past Medical History


Cardiovascular: Hypertension


Respiratory: None


Neuro: None


Endocrine/Autoimmune: None


GI: None


GYN: Miscarriage(s)


: None


HEENT: None


Psych: None


Musculoskeletal: None


Derm: None





- Past Surgical History


Past Surgical History: Yes


General: Cholecystectomy





- Present Medications


Home Medications: 


                                Ambulatory Orders











 Medication  Instructions  Recorded  Confirmed


 


lisinopriL [Prinivil] 0 mg ORAL DAILY 02/05/19 11/01/19


 


methocarbamoL [Methocarbamol] 500 mg PO TID PRN #10 tablet 11/01/19 


 


traMADol [Ultram] 50 - 100 mg PO Q6H #20 tablet 10/11/22 














- Allergies


Allergies/Adverse Reactions: 


                                    Allergies











Allergy/AdvReac Type Severity Reaction Status Date / Time


 


hydrocodone bitartrate * AdvReac Mild Hives Verified 11/01/19 19:21





[From Vicodin]     














- Social History


Does the pt smoke?: No


Smoking Status: Never smoker


Does the pt drink ETOH?: No


Does the pt have substance abuse?: No





- Immunizations


Immunizations are current?: Yes





- POLST


Patient has POLST: No





PD ED PE NORMAL





- Vitals


Vital signs reviewed: Yes





- General


General: Alert and oriented X 3, No acute distress





- HEENT


HEENT: Atraumatic, PERRL, Ears normal, Moist mucous membranes, Pharynx benign





- Neck


Neck: Supple, no meningeal sign, Other (Mild upper C-spine tenderness palpation.

 No step-off or deformity.)





- Cardiac


Cardiac: RRR, Strong equal pulses





- Respiratory


Respiratory: No respiratory distress, Clear bilaterally





- Abdomen


Abdomen: Soft, Non tender, Non distended





- Back


Back: No spinal TTP





- Derm


Derm: Warm and dry





- Extremities


Extremities: Normal ROM s pain





- Neuro


Neuro: Alert and oriented X 3, CNs 2-12 intact, No motor deficit, No sensory 

deficit, Normal speech


Eye Opening: Spontaneous


Motor: Obeys Commands


Verbal: Oriented


GCS Score: 15





- Psych


Psych: Normal mood, Normal affect





Results





- Vitals


Vitals: 


                               Vital Signs - 24 hr











  10/11/22 10/11/22 10/11/22





  14:53 16:01 16:30


 


Temperature 37.0 C  


 


Heart Rate 75 92 70


 


Respiratory 18 18 18





Rate   


 


Blood Pressure 177/100 H 150/100 H 163/95 H


 


O2 Saturation 99 99 99








                                     Oxygen











O2 Source                      Room air

















- Rads (name of study)


  ** CT head


Radiology: Final report received, EMP read contemporaneously, See rad report





  ** CT cervical spine


Radiology: Final report received, EMP read contemporaneously, See rad report





PD MEDICAL DECISION MAKING





- ED course


Complexity details: reviewed results, re-evaluated patient, considered differe

ntial, d/w patient


ED course: 





42-year-old female status post a closed head injury.  No acute findings on head 

CT or cervical spine CT.  Cervical collar removed after cervical spine CT.  Pain

well controlled.  No vomiting.  No seizure activity.  Head injury instructions 

given at bedside.  Patient and family counseled regarding signs and symptoms for

which I believe and urgent re-evaluation would be necessary. Patient with good 

understanding of and agreement to plan and is comfortable going home at this 

time





This document was made in part using voice recognition software. While efforts 

are made to proofread this document, sound alike and grammatical errors may 

occur.





Departure





- Departure


Disposition: 01 Home, Self Care


Clinical Impression: 


Closed head injury


Qualifiers:


 Encounter type: initial encounter Qualified Code(s): S09.90XA - Unspecified 

injury of head, initial encounter





Condition: Good


Instructions:  ED Head Injury Closed


Follow-Up: 


your,doctor in 1 week [Other]


Prescriptions: 


traMADol [Ultram] 50 - 100 mg PO Q6H #20 tablet


Comments: 


Your head CT and cervical spine CT do not show any acute abnormalities tonight. 

 You likely do have a mild concussion.  Do not drive until your headaches have 

resolved.  Please return if you worsen.





Your prescriptions were sent to [facility] in [place].





I am prescribing a short course of narcotic pain medication for you. These are 

potentially dangerous and addictive medications that should be used carefully. 


These medications may constipate you. Take an over-the-counter stool softener 

(docusate) twice daily with plenty of water while taking these medications. If 

you go 24 hours without a bowel movement, take over-the-counter miralax, per 

package instructions.


Do not drink or drive while taking these medications. 


If you received narcotic or sedating medications while in the emergency 

department, do not drive for 24 hours.


Store this medication in a safe, secure place and out of reach of children. 


It is a violation of federal law to give or sell this medication to another 

person or to use in a manner other than prescribed.


The ED will not refill narcotic prescriptions, including prescriptions lost or 

stolen.


To dispose of unwanted medications:


1. [facility] [address] [place] [address]. in [facility] has a medication drop box. They accept prescription medications (in pill

 form) Monday through Friday 9:00 a.m. to 5:00 p.m. 


2. [facility] [employer] accepts prescription medications (in

 pill form only) for disposal year round. Call [phone] for more 

information. 


3. Contact the [facility] for the next [facility] sponsored prescription 

drug collection event. [phone], [phone], or [phone] 

[phone];


Discharge Date/Time: 10/11/22 16:40

## 2022-10-11 NOTE — CT REPORT
PROCEDURE:  CERVICAL SPINE WO

 

INDICATIONS:  Trauma, fall, neck pain neck pain

 

TECHNIQUE:  

Noncontrast 3 mm thick sections acquired from the skull base to the T4 level.  Sagittal and coronal r
eformats were then constructed.  For radiation dose reduction, the following was used:  automated exp
osure control, adjustment of mA and/or kV according to patient size.

 

COMPARISON:  None.

 

FINDINGS:  

Image quality:  Excellent.  

 

Bones:  No fractures or dislocations.  Visualized superior ribs are intact.  

 

Soft tissues:  Prevertebral soft tissues are normal in thickness.  No paravertebral hematomas.  No ap
ical pneumothoraces.  

 

IMPRESSION:  

No CT evidence of acute or metastatic cervical spine injury.

 

Reviewed by: Jordan Maria MD on 10/11/2022 4:06 PM PDT

Approved by: Jordan Maria MD on 10/11/2022 4:06 PM PDT

 

 

Station ID:  [id number]

## 2022-10-27 ENCOUNTER — HOSPITAL ENCOUNTER (OUTPATIENT)
Dept: HOSPITAL 76 - DI | Age: 42
Discharge: HOME | End: 2022-10-27
Attending: PHYSICIAN ASSISTANT
Payer: MEDICAID

## 2022-10-27 DIAGNOSIS — N63.25: Primary | ICD-10-CM

## 2022-10-27 DIAGNOSIS — N64.4: ICD-10-CM

## 2022-10-28 NOTE — ULTRASOUND REPORT
LIMITED ULTRASOUND OF LEFT BREAST: 10/27/2022

CLINICAL: Focal left breast pain. Palpable left breast lump by physician.  

 

Comparison is made to exams dated:  10/27/2022 mammogram - [facility], 2/20/2019 ma
mmogram, and 2/20/2019 ultrasound - [facility].  

 Real-time ultrasound of the left breast 5 o'clock and 9 o'clock regions was performed.  Gray scale i
mages of the real-time examination were reviewed.  

 

No significant abnormalities were seen sonographically in the left breast.  

 

IMPRESSION: NEGATIVE 

There is no sonographic evidence of malignancy.  

There is no abnormality seen in the left breast to correspond with the palpable abnormality at 5 o'cl
ock, however, clinical followup is recommended.  There is no abnormality seen in the left breast to c
orrespond with the pain at 9 o'clock, however, clinical followup is recommended.  

A 1 year screening mammogram is recommended.  

 

 

This exam was interpreted at Station ID: [id number].  

Electronically Signed By: Emil wan/roland:10/27/2022 20:34:19  

 

 

 

Ultrasound BI-RADS: 1 Negative

BI-RADS CATEGORY: (1) - 1

RECOMMENDATION: (ANNUAL)  - Recommend routine annual screening mammography.

[id number]

1 year screening

LATERALITY: (B)

## 2022-10-28 NOTE — MAMMOGRAPHY REPORT
BILATERAL DIGITAL DIAGNOSTIC MAMMOGRAM 3D/2D: 10/27/2022

 

CLINICAL: Palpable left breast lump by physician. Focal left breast pain.  

 

Comparison is made to exam dated:  2/20/2019 mammogram - [facility].  

 

There are scattered areas of fibroglandular density in both breasts (category b / 25%-50% glandular t
issue).  

 

No significant masses, calcifications, or other findings are seen in either breast.  

 

IMPRESSION: INCOMPLETE: NEEDS ADDITIONAL IMAGING EVALUATION

There is no abnormality seen in the left breast to correspond with the palpable abnormality in the in
ferior aspect, however, ultrasound is recommended.  There is no abnormality seen in the left breast t
o correspond with the pain in the medial aspect, however, ultrasound is recommended.  

 

 

 

Based on the Tyrer Cuzick model (a risk assessment model) the patients lifetime risk is 5.7% and her
 10 year risk is 0.8%. According to the ACR, ACS, and NCCN guidelines, an annual breast MRI exam wilmer
g with mammogram is recommended if the patients lifetime risk is 20% or greater.

 

 

This exam was interpreted at Station ID: [id number].  

 

NOTE: For mammograms, a report in lay terms will be sent to the patient. Approximately 15% of breast 
malignancies will not be visualized mammographically. In the management of a palpable breast mass, a 
negative mammogram must not discourage biopsy of a clinically suspicious lesion.

 

Electronically Signed By: Emil wan/roland:10/27/2022 20:32:26  

 

 

 

ACR BI-RADS Category 0: Incomplete 3340F

PARENCHYMAL PATTERN: (A) - The breast(s) demonstrate(s) scattered fibroglandular densities.

BI-RADS CATEGORY: (0) - 0

Ultrasound

[id number]

Immediate follow-up

LATERALITY: (L)

## 2022-12-08 ENCOUNTER — HOSPITAL ENCOUNTER (OUTPATIENT)
Dept: HOSPITAL 76 - LAB.S | Age: 42
Discharge: HOME | End: 2022-12-08
Attending: INTERNAL MEDICINE
Payer: MEDICAID

## 2022-12-08 DIAGNOSIS — L40.50: Primary | ICD-10-CM

## 2022-12-08 LAB
ALBUMIN DIAFP-MCNC: 4 G/DL (ref 3.2–5.5)
ALBUMIN/GLOB SERPL: 1.3 {RATIO} (ref 1–2.2)
ALP SERPL-CCNC: 77 IU/L (ref 42–121)
ALT SERPL W P-5'-P-CCNC: 22 IU/L (ref 10–60)
ANION GAP SERPL CALCULATED.4IONS-SCNC: 9 MMOL/L (ref 6–13)
AST SERPL W P-5'-P-CCNC: 19 IU/L (ref 10–42)
BASOPHILS NFR BLD AUTO: 0 10^3/UL (ref 0–0.1)
BASOPHILS NFR BLD AUTO: 0.3 %
BILIRUB BLD-MCNC: 0.4 MG/DL (ref 0.2–1)
BUN SERPL-MCNC: 12 MG/DL (ref 6–20)
CALCIUM UR-MCNC: 9 MG/DL (ref 8.5–10.3)
CHLORIDE SERPL-SCNC: 105 MMOL/L (ref 101–111)
CO2 SERPL-SCNC: 25 MMOL/L (ref 21–32)
CREAT SERPLBLD-SCNC: 0.7 MG/DL (ref 0.4–1)
CRP SERPL-MCNC: < 1 MG/DL (ref 0–1)
EOSINOPHIL # BLD AUTO: 0.1 10^3/UL (ref 0–0.7)
EOSINOPHIL NFR BLD AUTO: 0.6 %
ERYTHROCYTE [DISTWIDTH] IN BLOOD BY AUTOMATED COUNT: 13 % (ref 12–15)
GFRSERPLBLD MDRD-ARVRAT: 92 ML/MIN/{1.73_M2} (ref 89–?)
GLOBULIN SER-MCNC: 3.1 G/DL (ref 2.1–4.2)
GLUCOSE SERPL-MCNC: 121 MG/DL (ref 70–100)
HCT VFR BLD AUTO: 43.2 % (ref 37–47)
HGB UR QL STRIP: 14.2 G/DL (ref 12–16)
LYMPHOCYTES # SPEC AUTO: 1.6 10^3/UL (ref 1.5–3.5)
LYMPHOCYTES NFR BLD AUTO: 16.9 %
MCH RBC QN AUTO: 31.5 PG (ref 27–31)
MCHC RBC AUTO-ENTMCNC: 32.9 G/DL (ref 32–36)
MCV RBC AUTO: 95.8 FL (ref 81–99)
MONOCYTES # BLD AUTO: 0.2 10^3/UL (ref 0–1)
MONOCYTES NFR BLD AUTO: 2.2 %
NEUTROPHILS # BLD AUTO: 7.7 10^3/UL (ref 1.5–6.6)
NEUTROPHILS # SNV AUTO: 9.6 X10^3/UL (ref 4.8–10.8)
NEUTROPHILS NFR BLD AUTO: 79.6 %
NRBC # BLD AUTO: 0 /100WBC
NRBC # BLD AUTO: 0 X10^3/UL
PDW BLD AUTO: 12.9 FL (ref 7.9–10.8)
PLATELET # BLD: 224 10^3/UL (ref 130–450)
POTASSIUM SERPL-SCNC: 4.2 MMOL/L (ref 3.5–5)
PROT SPEC-MCNC: 7.1 G/DL (ref 6.7–8.2)
RBC MAR: 4.51 10^6/UL (ref 4.2–5.4)
SODIUM SERPLBLD-SCNC: 139 MMOL/L (ref 135–145)

## 2022-12-08 PROCEDURE — 86704 HEP B CORE ANTIBODY TOTAL: CPT

## 2022-12-08 PROCEDURE — 86317 IMMUNOASSAY INFECTIOUS AGENT: CPT

## 2022-12-08 PROCEDURE — 87340 HEPATITIS B SURFACE AG IA: CPT

## 2022-12-08 PROCEDURE — 86140 C-REACTIVE PROTEIN: CPT

## 2022-12-08 PROCEDURE — 86480 TB TEST CELL IMMUN MEASURE: CPT

## 2022-12-08 PROCEDURE — 80053 COMPREHEN METABOLIC PANEL: CPT

## 2022-12-08 PROCEDURE — 86803 HEPATITIS C AB TEST: CPT

## 2022-12-08 PROCEDURE — 36415 COLL VENOUS BLD VENIPUNCTURE: CPT

## 2022-12-08 PROCEDURE — 85651 RBC SED RATE NONAUTOMATED: CPT

## 2022-12-08 PROCEDURE — 81599 UNLISTED MAAA: CPT

## 2022-12-08 PROCEDURE — 85025 COMPLETE CBC W/AUTO DIFF WBC: CPT

## 2022-12-09 LAB
HBV SURFACE AB SER-ACNC: <3.1 MIU/ML
HCV AB S/CO SERPL IA: <0.1 S/CO RATIO (ref 0–0.9)

## 2023-06-29 ENCOUNTER — HOSPITAL ENCOUNTER (OUTPATIENT)
Dept: HOSPITAL 76 - DI.S | Age: 43
Discharge: HOME | End: 2023-06-29
Attending: PHYSICIAN ASSISTANT
Payer: MEDICAID

## 2023-06-29 DIAGNOSIS — S92.025A: Primary | ICD-10-CM

## 2023-06-29 NOTE — XRAY REPORT
PROCEDURE:  Ankle 3 View LT

 

INDICATIONS:  LEFT ANKLE PAIN

 

TECHNIQUE:  3 views of the ankle were acquired.  

 

COMPARISON:  None.

 

FINDINGS:  

 

Bones:  Small fracture through the anterior process of the calcaneus noted on the lateral

 

Soft tissues:  No tibiotalar joint effusion.  Achilles tendon appears normal.  Dorsal soft tissue swe
lling

 

IMPRESSION:  

Small nondisplaced fracture through the anterior calcaneal process noted on the lateral exam

 

 

 

Reviewed by: Sae Johns MD on 6/29/2023 3:31 PM SHELLEY

Approved by: Sae Johns MD on 6/29/2023 3:31 PM AKKATIE

 

 

Station ID:  [id number]

## 2023-07-06 ENCOUNTER — HOSPITAL ENCOUNTER (OUTPATIENT)
Dept: HOSPITAL 76 - DI.WOS | Age: 43
Discharge: HOME | End: 2023-07-06
Attending: ORTHOPAEDIC SURGERY
Payer: MEDICAID

## 2023-07-06 DIAGNOSIS — S92.022A: Primary | ICD-10-CM

## 2023-07-06 NOTE — XRAY REPORT
PROCEDURE:  Foot 3 View LT

 

INDICATIONS:  LEFT FOOT INJURY

 

TECHNIQUE:  3 views of the foot were acquired.  

 

COMPARISON:  X-ray ankle 7/6/2023, 6/29/2023

 

FINDINGS:  

 

Bones:  Unchanged anterior calcaneal fracture. In addition, small lucency is noted along the dorsal a
spect of the navicular bone..  No suspicious bony lesions.  

 

Soft tissues:  No suspicious soft tissue calcifications or masses.  

 

 

IMPRESSION:  

Unchanged minimal anterior calcaneal fracture.

 

Small lucency along the dorsal navicular bone suggests a small avulsion.

 

 

 

Reviewed by: Roselia Menendez MD on 7/6/2023 3:11 PM PDT

Approved by: Roselia Menendez MD on 7/6/2023 3:11 PM PDT

 

 

Station ID:  [id number]

## 2023-07-06 NOTE — XRAY REPORT
PROCEDURE:  Ankle 3 View LT

 

INDICATIONS:  LEFT ANKLE INJURY

 

TECHNIQUE:  3 views of the ankle were acquired.  

 

COMPARISON:  X-ray foot 7/6/2023, x-ray ankle 6/29/2023

 

FINDINGS:  

 

Bones:  Minimally displaced anterior calcaneal fracture unchanged. Unchanged appearance of lucency al
araseli the dorsal aspect of the navicular seen only on lateral view. Ankle mortise is normally aligned. 
 No suspicious bony lesions.  

 

Soft tissues:  No tibiotalar joint effusion.  Achilles tendon appears normal.  

 

IMPRESSION:  

Unchanged minimally displaced anterior calcaneal fracture.

 

Small lucency on the dorsal aspect of the navicular, with avulsion fracture suspected. Recommend elidia
elation of point tenderness.

 

 

 

Reviewed by: Roselia Menendez MD on 7/6/2023 3:09 PM PDT

Approved by: Roselia Menendez MD on 7/6/2023 3:09 PM PDT

 

 

Station ID:  [id number]

## 2024-02-12 ENCOUNTER — HOSPITAL ENCOUNTER (OUTPATIENT)
Dept: HOSPITAL 76 - LAB.S | Age: 44
Discharge: HOME | End: 2024-02-12
Attending: INTERNAL MEDICINE
Payer: MEDICAID

## 2024-02-12 DIAGNOSIS — L40.50: Primary | ICD-10-CM

## 2024-02-12 LAB
ALBUMIN DIAFP-MCNC: 4.5 G/DL (ref 3.2–5.5)
ALBUMIN/GLOB SERPL: 1.9 {RATIO} (ref 1–2.2)
ALP SERPL-CCNC: 62 IU/L (ref 42–121)
ALT SERPL W P-5'-P-CCNC: 29 IU/L (ref 10–60)
ANION GAP SERPL CALCULATED.4IONS-SCNC: 8 MMOL/L (ref 6–13)
AST SERPL W P-5'-P-CCNC: 21 IU/L (ref 10–42)
BASOPHILS NFR BLD AUTO: 0 10^3/UL (ref 0–0.1)
BASOPHILS NFR BLD AUTO: 0.3 %
BILIRUB BLD-MCNC: 0.7 MG/DL (ref 0.2–1)
BUN SERPL-MCNC: 9 MG/DL (ref 6–20)
CALCIUM UR-MCNC: 9.5 MG/DL (ref 8.5–10.3)
CHLORIDE SERPL-SCNC: 105 MMOL/L (ref 101–111)
CO2 SERPL-SCNC: 25 MMOL/L (ref 21–32)
CREAT SERPLBLD-SCNC: 0.7 MG/DL (ref 0.6–1.3)
CRP SERPL-MCNC: < 0.5 MG/DL (ref ?–0.5)
EOSINOPHIL # BLD AUTO: 0.1 10^3/UL (ref 0–0.7)
EOSINOPHIL NFR BLD AUTO: 2.1 %
ERYTHROCYTE [DISTWIDTH] IN BLOOD BY AUTOMATED COUNT: 12.8 % (ref 12–15)
GFRSERPLBLD MDRD-ARVRAT: 91 ML/MIN/{1.73_M2} (ref 89–?)
GLOBULIN SER-MCNC: 2.4 G/DL (ref 2.1–4.2)
GLUCOSE SERPL-MCNC: 88 MG/DL (ref 74–104)
HCT VFR BLD AUTO: 45.9 % (ref 37–47)
HGB UR QL STRIP: 14.7 G/DL (ref 12–16)
LYMPHOCYTES # SPEC AUTO: 2.6 10^3/UL (ref 1.5–3.5)
LYMPHOCYTES NFR BLD AUTO: 39.2 %
MCH RBC QN AUTO: 31 PG (ref 27–31)
MCHC RBC AUTO-ENTMCNC: 32 G/DL (ref 32–36)
MCV RBC AUTO: 96.8 FL (ref 81–99)
MONOCYTES # BLD AUTO: 0.5 10^3/UL (ref 0–1)
MONOCYTES NFR BLD AUTO: 7.3 %
NEUTROPHILS # BLD AUTO: 3.4 10^3/UL (ref 1.5–6.6)
NEUTROPHILS # SNV AUTO: 6.6 X10^3/UL (ref 4.8–10.8)
NEUTROPHILS NFR BLD AUTO: 50.8 %
NRBC # BLD AUTO: 0 /100WBC
NRBC # BLD AUTO: 0 X10^3/UL
PDW BLD AUTO: 13 FL (ref 7.9–10.8)
PLATELET # BLD: 226 10^3/UL (ref 130–450)
POTASSIUM SERPL-SCNC: 4.2 MMOL/L (ref 3.5–4.5)
PROT SPEC-MCNC: 6.9 G/DL (ref 6.4–8.9)
RBC MAR: 4.74 10^6/UL (ref 4.2–5.4)
SODIUM SERPLBLD-SCNC: 138 MMOL/L (ref 135–145)

## 2024-02-12 PROCEDURE — 85651 RBC SED RATE NONAUTOMATED: CPT

## 2024-02-12 PROCEDURE — 85025 COMPLETE CBC W/AUTO DIFF WBC: CPT

## 2024-02-12 PROCEDURE — 80053 COMPREHEN METABOLIC PANEL: CPT

## 2024-02-12 PROCEDURE — 86140 C-REACTIVE PROTEIN: CPT

## 2024-02-12 PROCEDURE — 36415 COLL VENOUS BLD VENIPUNCTURE: CPT

## 2024-09-09 ENCOUNTER — HOSPITAL ENCOUNTER (OUTPATIENT)
Dept: HOSPITAL 76 - LAB.S | Age: 44
Discharge: HOME | End: 2024-09-09
Attending: INTERNAL MEDICINE
Payer: MEDICAID

## 2024-09-09 DIAGNOSIS — L40.50: Primary | ICD-10-CM

## 2024-09-09 LAB
ALBUMIN DIAFP-MCNC: 4.2 G/DL (ref 3.2–5.5)
ALBUMIN/GLOB SERPL: 2 {RATIO} (ref 1–2.2)
ALP SERPL-CCNC: 60 IU/L (ref 42–121)
ALT SERPL W P-5'-P-CCNC: 20 IU/L (ref 10–60)
ANION GAP SERPL CALCULATED.4IONS-SCNC: 5 MMOL/L (ref 6–13)
AST SERPL W P-5'-P-CCNC: 15 IU/L (ref 10–42)
BILIRUB BLD-MCNC: 0.6 MG/DL (ref 0.2–1)
BUN SERPL-MCNC: 10 MG/DL (ref 6–20)
CALCIUM UR-MCNC: 8.8 MG/DL (ref 8.5–10.3)
CHLORIDE SERPL-SCNC: 108 MMOL/L (ref 101–111)
CO2 SERPL-SCNC: 25 MMOL/L (ref 21–32)
CREAT SERPLBLD-SCNC: 0.7 MG/DL (ref 0.6–1.3)
CRP SERPL-MCNC: < 0.5 MG/DL (ref ?–0.5)
ERYTHROCYTE [DISTWIDTH] IN BLOOD BY AUTOMATED COUNT: 12.8 % (ref 12–15)
GFRSERPLBLD MDRD-ARVRAT: 91 ML/MIN/{1.73_M2} (ref 89–?)
GLOBULIN SER-MCNC: 2.1 G/DL (ref 2.1–4.2)
GLUCOSE SERPL-MCNC: 98 MG/DL (ref 74–104)
HCT VFR BLD AUTO: 44.6 % (ref 37–47)
HGB UR QL STRIP: 14.2 G/DL (ref 12–16)
MCH RBC QN AUTO: 30.7 PG (ref 27–31)
MCHC RBC AUTO-ENTMCNC: 31.8 G/DL (ref 32–36)
MCV RBC AUTO: 96.3 FL (ref 81–99)
NEUTROPHILS # SNV AUTO: 7.1 X10^3/UL (ref 4.8–10.8)
PDW BLD AUTO: 13.4 FL (ref 7.9–10.8)
PLATELET # BLD: 201 10^3/UL (ref 130–450)
POTASSIUM SERPL-SCNC: 4.2 MMOL/L (ref 3.5–4.5)
PROT SPEC-MCNC: 6.3 G/DL (ref 6.4–8.9)
RBC MAR: 4.63 10^6/UL (ref 4.2–5.4)
SODIUM SERPLBLD-SCNC: 138 MMOL/L (ref 135–145)

## 2024-09-09 PROCEDURE — 85027 COMPLETE CBC AUTOMATED: CPT

## 2024-09-09 PROCEDURE — 36415 COLL VENOUS BLD VENIPUNCTURE: CPT

## 2024-09-09 PROCEDURE — 85651 RBC SED RATE NONAUTOMATED: CPT

## 2024-09-09 PROCEDURE — 86140 C-REACTIVE PROTEIN: CPT

## 2024-09-09 PROCEDURE — 80053 COMPREHEN METABOLIC PANEL: CPT
